# Patient Record
Sex: FEMALE | Race: BLACK OR AFRICAN AMERICAN | URBAN - METROPOLITAN AREA
[De-identification: names, ages, dates, MRNs, and addresses within clinical notes are randomized per-mention and may not be internally consistent; named-entity substitution may affect disease eponyms.]

---

## 2021-10-15 ENCOUNTER — HOSPITAL ENCOUNTER (OUTPATIENT)
Age: 56
Setting detail: SPECIMEN
Discharge: HOME | End: 2021-10-15

## 2021-10-15 PROCEDURE — 88341 IMHCHEM/IMCYTCHM EA ADD ANTB: CPT | Performed by: PATHOLOGY

## 2021-10-15 PROCEDURE — 88342 IMHCHEM/IMCYTCHM 1ST ANTB: CPT | Performed by: PATHOLOGY

## 2022-10-21 ENCOUNTER — OFFICE VISIT (OUTPATIENT)
Dept: FAMILY MEDICINE CLINIC | Age: 57
End: 2022-10-21
Payer: COMMERCIAL

## 2022-10-21 VITALS
BODY MASS INDEX: 46.42 KG/M2 | DIASTOLIC BLOOD PRESSURE: 110 MMHG | HEIGHT: 65 IN | WEIGHT: 278.6 LBS | OXYGEN SATURATION: 96 % | SYSTOLIC BLOOD PRESSURE: 155 MMHG | HEART RATE: 83 BPM

## 2022-10-21 DIAGNOSIS — I10 PRIMARY HYPERTENSION: ICD-10-CM

## 2022-10-21 DIAGNOSIS — E66.01 CLASS 3 SEVERE OBESITY WITH BODY MASS INDEX (BMI) OF 45.0 TO 49.9 IN ADULT, UNSPECIFIED OBESITY TYPE, UNSPECIFIED WHETHER SERIOUS COMORBIDITY PRESENT (HCC): ICD-10-CM

## 2022-10-21 DIAGNOSIS — I10 PRIMARY HYPERTENSION: Primary | ICD-10-CM

## 2022-10-21 LAB
A/G RATIO: 1.4 (ref 1.1–2.2)
ALBUMIN SERPL-MCNC: 4.1 G/DL (ref 3.4–5)
ALP BLD-CCNC: 161 U/L (ref 40–129)
ALT SERPL-CCNC: 16 U/L (ref 10–40)
ANION GAP SERPL CALCULATED.3IONS-SCNC: 15 MMOL/L (ref 3–16)
AST SERPL-CCNC: 22 U/L (ref 15–37)
BASOPHILS ABSOLUTE: 0 K/UL (ref 0–0.2)
BASOPHILS RELATIVE PERCENT: 0.3 %
BILIRUB SERPL-MCNC: 0.4 MG/DL (ref 0–1)
BUN BLDV-MCNC: 17 MG/DL (ref 7–20)
CALCIUM SERPL-MCNC: 9.2 MG/DL (ref 8.3–10.6)
CHLORIDE BLD-SCNC: 105 MMOL/L (ref 99–110)
CHOLESTEROL, TOTAL: 166 MG/DL (ref 0–199)
CO2: 24 MMOL/L (ref 21–32)
CREAT SERPL-MCNC: 0.8 MG/DL (ref 0.6–1.1)
EOSINOPHILS ABSOLUTE: 0.2 K/UL (ref 0–0.6)
EOSINOPHILS RELATIVE PERCENT: 2.9 %
GFR SERPL CREATININE-BSD FRML MDRD: >60 ML/MIN/{1.73_M2}
GLUCOSE FASTING: 83 MG/DL (ref 70–99)
HCT VFR BLD CALC: 40.5 % (ref 36–48)
HDLC SERPL-MCNC: 46 MG/DL (ref 40–60)
HEMOGLOBIN: 13 G/DL (ref 12–16)
LDL CHOLESTEROL CALCULATED: 106 MG/DL
LYMPHOCYTES ABSOLUTE: 2.4 K/UL (ref 1–5.1)
LYMPHOCYTES RELATIVE PERCENT: 43.5 %
MCH RBC QN AUTO: 29.3 PG (ref 26–34)
MCHC RBC AUTO-ENTMCNC: 32.2 G/DL (ref 31–36)
MCV RBC AUTO: 90.8 FL (ref 80–100)
MONOCYTES ABSOLUTE: 0.3 K/UL (ref 0–1.3)
MONOCYTES RELATIVE PERCENT: 5.6 %
NEUTROPHILS ABSOLUTE: 2.7 K/UL (ref 1.7–7.7)
NEUTROPHILS RELATIVE PERCENT: 47.7 %
PDW BLD-RTO: 14 % (ref 12.4–15.4)
PLATELET # BLD: 222 K/UL (ref 135–450)
PMV BLD AUTO: 8.7 FL (ref 5–10.5)
POTASSIUM SERPL-SCNC: 4.1 MMOL/L (ref 3.5–5.1)
RBC # BLD: 4.46 M/UL (ref 4–5.2)
SODIUM BLD-SCNC: 144 MMOL/L (ref 136–145)
T4 FREE: 1.1 NG/DL (ref 0.9–1.8)
TOTAL PROTEIN: 7 G/DL (ref 6.4–8.2)
TRIGL SERPL-MCNC: 71 MG/DL (ref 0–150)
TSH SERPL DL<=0.05 MIU/L-ACNC: 1.03 UIU/ML (ref 0.27–4.2)
VLDLC SERPL CALC-MCNC: 14 MG/DL
WBC # BLD: 5.6 K/UL (ref 4–11)

## 2022-10-21 PROCEDURE — 99204 OFFICE O/P NEW MOD 45 MIN: CPT | Performed by: FAMILY MEDICINE

## 2022-10-21 RX ORDER — LOSARTAN POTASSIUM 50 MG/1
50 TABLET ORAL DAILY
Qty: 30 TABLET | Refills: 5 | Status: SHIPPED | OUTPATIENT
Start: 2022-10-21

## 2022-10-21 ASSESSMENT — PATIENT HEALTH QUESTIONNAIRE - PHQ9
SUM OF ALL RESPONSES TO PHQ QUESTIONS 1-9: 1
1. LITTLE INTEREST OR PLEASURE IN DOING THINGS: 0
SUM OF ALL RESPONSES TO PHQ QUESTIONS 1-9: 1
2. FEELING DOWN, DEPRESSED OR HOPELESS: 1
SUM OF ALL RESPONSES TO PHQ QUESTIONS 1-9: 1
SUM OF ALL RESPONSES TO PHQ9 QUESTIONS 1 & 2: 1
SUM OF ALL RESPONSES TO PHQ QUESTIONS 1-9: 1

## 2022-10-21 NOTE — PROGRESS NOTES
Dale Cortester  1965  10/23/22    Chief Complaint   Patient presents with    New Patient     Patient here to establish care with PCP. 59 yrs old lady with non PMH came today to establish with us, came from [facility],  with her, patient stats had mammogram and left breast biopsy done was negative. History reviewed. No pertinent past medical history. History reviewed. No pertinent surgical history. Family History   Problem Relation Age of Onset    Hypertension Brother      Social History     Socioeconomic History    Marital status: Single     Spouse name: Not on file    Number of children: Not on file    Years of education: Not on file    Highest education level: Not on file   Occupational History    Not on file   Tobacco Use    Smoking status: Never    Smokeless tobacco: Never   Substance and Sexual Activity    Alcohol use: Never    Drug use: Not on file    Sexual activity: Not on file   Other Topics Concern    Not on file   Social History Narrative    Not on file     Social Determinants of Health     Financial Resource Strain: Not on file   Food Insecurity: Not on file   Transportation Needs: Not on file   Physical Activity: Not on file   Stress: Not on file   Social Connections: Not on file   Intimate Partner Violence: Not on file   Housing Stability: Not on file       No Known Allergies  Current Outpatient Medications   Medication Sig Dispense Refill    losartan (COZAAR) 50 MG tablet Take 1 tablet by mouth daily 30 tablet 5     No current facility-administered medications for this visit. Review of Systems   Constitutional:  Negative for activity change, appetite change, chills, fatigue and fever. HENT:  Negative for congestion, postnasal drip, sinus pressure and sore throat. Eyes:  Positive for visual disturbance (R eye). Respiratory:  Negative for cough, chest tightness, shortness of breath and wheezing. Cardiovascular:  Negative for chest pain and leg swelling. Gastrointestinal:  Negative for abdominal pain, constipation and diarrhea. Genitourinary:  Negative for dysuria and frequency. Musculoskeletal:  Negative for back pain and gait problem. Skin:  Negative for rash. Neurological:  Negative for dizziness, weakness and headaches. Psychiatric/Behavioral:  Negative for agitation, behavioral problems and sleep disturbance. The patient is not nervous/anxious. BP (!) 155/110   Pulse 83   Ht 5' 5\" (1.651 m)   Wt 278 lb 9.6 oz (126.4 kg)   SpO2 96%   BMI 46.36 kg/m²     BP Readings from Last 3 Encounters:   10/21/22 (!) 155/110       Wt Readings from Last 3 Encounters:   10/21/22 278 lb 9.6 oz (126.4 kg)         Physical Exam  Constitutional:       General: She is not in acute distress. Appearance: Normal appearance. She is well-developed. She is obese. She is not ill-appearing or diaphoretic. HENT:      Head: Normocephalic and atraumatic. Right Ear: Tympanic membrane normal. There is no impacted cerumen. Left Ear: Tympanic membrane normal. There is no impacted cerumen. Eyes:      General: No scleral icterus. Pupils: Pupils are equal, round, and reactive to light. Cardiovascular:      Rate and Rhythm: Normal rate and regular rhythm. Heart sounds: Normal heart sounds. No murmur heard. Pulmonary:      Effort: Pulmonary effort is normal.      Breath sounds: Normal breath sounds. No wheezing. Abdominal:      General: There is no distension. Palpations: There is no mass. Tenderness: There is no abdominal tenderness. Comments: obese   Musculoskeletal:         General: Normal range of motion. Cervical back: Normal range of motion and neck supple. No rigidity. Right lower leg: No edema. Left lower leg: No edema. Skin:     Findings: No rash. Neurological:      General: No focal deficit present. Mental Status: She is alert and oriented to person, place, and time.       Cranial Nerves: No cranial nerve deficit. Psychiatric:         Mood and Affect: Mood normal.         Behavior: Behavior normal.       ASSESSMENT/ PLAN:    1. Primary hypertension  - blood pressure high so start the losartan  - Lipid Panel; Future  - CBC with Auto Differential; Future  - Comprehensive Metabolic Panel, Fasting; Future  - Hemoglobin A1C; Future  - T4, Free; Future  - TSH; Future  - losartan (COZAAR) 50 MG tablet; Take 1 tablet by mouth daily  Dispense: 30 tablet; Refill: 5    2. Class 3 severe obesity with body mass index (BMI) of 45.0 to 49.9 in adult, unspecified obesity type, unspecified whether serious comorbidity present ([facility] [address]  - encourage to loss wt    Need to see the eye Dr for her R eye vision problem        - we need to get the mammogram form OV  - Appropriate prescription are addressed. - After visit summery provided. - Questions answered and patient verbalizes understanding.  - Call for any problem, questions, or concerns. Return in about 2 weeks (around 11/4/2022).

## 2022-10-22 LAB
ESTIMATED AVERAGE GLUCOSE: 128.4 MG/DL
HBA1C MFR BLD: 6.1 %

## 2022-10-23 PROBLEM — I10 PRIMARY HYPERTENSION: Status: ACTIVE | Noted: 2022-10-23

## 2022-10-23 PROBLEM — E66.01 CLASS 3 SEVERE OBESITY WITH BODY MASS INDEX (BMI) OF 45.0 TO 49.9 IN ADULT (HCC): Status: ACTIVE | Noted: 2022-10-23

## 2022-10-23 ASSESSMENT — ENCOUNTER SYMPTOMS
BACK PAIN: 0
CONSTIPATION: 0
SHORTNESS OF BREATH: 0
WHEEZING: 0
COUGH: 0
ABDOMINAL PAIN: 0
SINUS PRESSURE: 0
CHEST TIGHTNESS: 0
SORE THROAT: 0
DIARRHEA: 0

## 2022-10-26 ENCOUNTER — COMMUNITY OUTREACH (OUTPATIENT)
Dept: FAMILY MEDICINE CLINIC | Age: 57
End: 2022-10-26

## 2022-10-26 NOTE — PROGRESS NOTES
Patient's HM shows they are overdue for Mammogram and Colorectal Screening. [employer] and  files searched. No results to attach to order nor HM updated.

## 2022-11-04 ENCOUNTER — OFFICE VISIT (OUTPATIENT)
Dept: FAMILY MEDICINE CLINIC | Age: 57
End: 2022-11-04
Payer: COMMERCIAL

## 2022-11-04 VITALS
HEART RATE: 88 BPM | SYSTOLIC BLOOD PRESSURE: 140 MMHG | WEIGHT: 275.8 LBS | BODY MASS INDEX: 45.9 KG/M2 | DIASTOLIC BLOOD PRESSURE: 85 MMHG | OXYGEN SATURATION: 96 %

## 2022-11-04 DIAGNOSIS — I10 PRIMARY HYPERTENSION: Primary | ICD-10-CM

## 2022-11-04 DIAGNOSIS — E66.01 CLASS 3 SEVERE OBESITY WITH BODY MASS INDEX (BMI) OF 45.0 TO 49.9 IN ADULT, UNSPECIFIED OBESITY TYPE, UNSPECIFIED WHETHER SERIOUS COMORBIDITY PRESENT (HCC): ICD-10-CM

## 2022-11-04 DIAGNOSIS — Z12.11 COLON CANCER SCREENING: ICD-10-CM

## 2022-11-04 DIAGNOSIS — M79.642 LEFT HAND PAIN: ICD-10-CM

## 2022-11-04 DIAGNOSIS — R73.03 PREDIABETES: ICD-10-CM

## 2022-11-04 PROCEDURE — 3077F SYST BP >= 140 MM HG: CPT | Performed by: FAMILY MEDICINE

## 2022-11-04 PROCEDURE — 99214 OFFICE O/P EST MOD 30 MIN: CPT | Performed by: FAMILY MEDICINE

## 2022-11-04 PROCEDURE — 3079F DIAST BP 80-89 MM HG: CPT | Performed by: FAMILY MEDICINE

## 2022-11-04 ASSESSMENT — ENCOUNTER SYMPTOMS
COUGH: 0
SHORTNESS OF BREATH: 0
ABDOMINAL PAIN: 0
BACK PAIN: 0

## 2022-11-04 NOTE — PROGRESS NOTES
Yessi Quintana  1965  11/04/22    Chief Complaint   Patient presents with    2 Week Follow-Up     For HTN, and discuss the lab results           Patient here with the  for 2 wks f/u regarding HTN and also to discuss the lab results. We did start her on losartan, The patient is taking hypertensive medications compliantly without side effects. Denies chest pain, dyspnea, edema, or TIA's. Patient also c/o:      Hand Pain   There was no injury mechanism. The pain is present in the left hand. The quality of the pain is described as cramping. The pain does not radiate. The pain is at a severity of 6/10. The pain is moderate. The pain has been Constant since the incident. Associated symptoms include numbness and tingling. Pertinent negatives include no chest pain or muscle weakness. The symptoms are aggravated by movement. She has tried nothing for the symptoms. No past medical history on file. No past surgical history on file.   Family History   Problem Relation Age of Onset    Hypertension Brother      Social History     Socioeconomic History    Marital status: Single     Spouse name: Not on file    Number of children: Not on file    Years of education: Not on file    Highest education level: Not on file   Occupational History    Not on file   Tobacco Use    Smoking status: Never    Smokeless tobacco: Never   Substance and Sexual Activity    Alcohol use: Never    Drug use: Not on file    Sexual activity: Not on file   Other Topics Concern    Not on file   Social History Narrative    Not on file     Social Determinants of Health     Financial Resource Strain: Not on file   Food Insecurity: Not on file   Transportation Needs: Not on file   Physical Activity: Not on file   Stress: Not on file   Social Connections: Not on file   Intimate Partner Violence: Not on file   Housing Stability: Not on file       No Known Allergies  Current Outpatient Medications   Medication Sig Dispense Refill    losartan (COZAAR) 50 MG tablet Take 1 tablet by mouth daily 30 tablet 5     No current facility-administered medications for this visit. Review of Systems   Constitutional:  Negative for activity change, appetite change, chills, fatigue and fever. HENT:  Negative for congestion. Respiratory:  Negative for cough and shortness of breath. Cardiovascular:  Negative for chest pain and leg swelling. Gastrointestinal:  Negative for abdominal pain. Genitourinary:  Negative for dysuria. Musculoskeletal:  Positive for arthralgias (left hand pain). Negative for back pain. Neurological:  Positive for tingling and numbness. Negative for dizziness, weakness and headaches. Psychiatric/Behavioral:  Negative for agitation, behavioral problems and sleep disturbance. The patient is not nervous/anxious.       Lab Results   Component Value Date    WBC 5.6 10/21/2022    HGB 13.0 10/21/2022    HCT 40.5 10/21/2022    MCV 90.8 10/21/2022     10/21/2022     Lab Results   Component Value Date     10/21/2022    K 4.1 10/21/2022     10/21/2022    CO2 24 10/21/2022    BUN 17 10/21/2022    CREATININE 0.8 10/21/2022    CALCIUM 9.2 10/21/2022    PROT 7.0 10/21/2022    LABALBU 4.1 10/21/2022    BILITOT 0.4 10/21/2022    ALKPHOS 161 (H) 10/21/2022    AST 22 10/21/2022    ALT 16 10/21/2022    LABGLOM >60 10/21/2022    AGRATIO 1.4 10/21/2022     Lab Results   Component Value Date    CHOL 166 10/21/2022     Lab Results   Component Value Date    TRIG 71 10/21/2022     Lab Results   Component Value Date    HDL 46 10/21/2022     Lab Results   Component Value Date    LDLCALC 106 (H) 10/21/2022     Lab Results   Component Value Date    LABA1C 6.1 10/21/2022     Lab Results   Component Value Date    TSH 1.03 10/21/2022         BP (!) 140/85   Pulse 88   Wt 275 lb 12.8 oz (125.1 kg)   SpO2 96%   BMI 45.90 kg/m²     BP Readings from Last 3 Encounters:   11/04/22 (!) 140/85   10/21/22 (!) 155/110       Wt Readings from Last 3 Encounters:   11/04/22 275 lb 12.8 oz (125.1 kg)   10/21/22 278 lb 9.6 oz (126.4 kg)         Physical Exam  Constitutional:       General: She is not in acute distress. Appearance: Normal appearance. She is well-developed. She is obese. She is not diaphoretic. HENT:      Head: Normocephalic and atraumatic. Eyes:      General: No scleral icterus. Pupils: Pupils are equal, round, and reactive to light. Cardiovascular:      Rate and Rhythm: Normal rate and regular rhythm. Heart sounds: Normal heart sounds. No murmur heard. Pulmonary:      Effort: Pulmonary effort is normal.      Breath sounds: Normal breath sounds. No wheezing. Musculoskeletal:         General: Normal range of motion. Left hand: No swelling or deformity. Decreased strength. There is disruption of two-point discrimination. Cervical back: Normal range of motion and neck supple. No rigidity. Right lower leg: No edema. Left lower leg: No edema. Neurological:      General: No focal deficit present. Mental Status: She is alert and oriented to person, place, and time. Psychiatric:         Mood and Affect: Mood normal.         Behavior: Behavior normal.       ASSESSMENT/ PLAN:    1. Primary hypertension  - better, last visit was started on losartan, no changes today    2. Prediabetes  - A1C 6.1, recommend to cut down the carb, and watch diet and lose wt    3. Left hand pain  - most likely carpal TS  - Bacilio Brown MD, Physical Medicine, [place]    4. Colon cancer screening  - Dani Bardales MD, Gastroenterology, [facility]    5. Class 3 severe obesity with body mass index (BMI) of 45.0 to 49.9 in adult, unspecified obesity type, unspecified whether serious comorbidity present ([facility] [address]  - encourage wt loss            - All old blood work reviewed with the patient  - Appropriate prescription are addressed. - After visit summery provided.   - Questions answered and patient verbalizes understanding.  - Call for any problem, questions, or concerns. Return in about 3 months (around 2/4/2023) for pap.

## 2022-11-16 ENCOUNTER — OFFICE VISIT (OUTPATIENT)
Dept: GASTROENTEROLOGY | Age: 57
End: 2022-11-16
Payer: COMMERCIAL

## 2022-11-16 VITALS
HEART RATE: 94 BPM | DIASTOLIC BLOOD PRESSURE: 84 MMHG | HEIGHT: 65 IN | OXYGEN SATURATION: 96 % | BODY MASS INDEX: 46.45 KG/M2 | WEIGHT: 278.8 LBS | TEMPERATURE: 97.1 F | SYSTOLIC BLOOD PRESSURE: 138 MMHG

## 2022-11-16 DIAGNOSIS — Z12.11 ENCOUNTER FOR SCREENING COLONOSCOPY: Primary | ICD-10-CM

## 2022-11-16 PROCEDURE — S0285 CNSLT BEFORE SCREEN COLONOSC: HCPCS | Performed by: NURSE PRACTITIONER

## 2022-11-16 RX ORDER — POLYETHYLENE GLYCOL 3350 17 G/17G
238 POWDER, FOR SOLUTION ORAL ONCE
Qty: 238 G | Refills: 0 | Status: SHIPPED | OUTPATIENT
Start: 2022-11-16 | End: 2022-11-16

## 2022-11-16 RX ORDER — BISACODYL 5 MG
TABLET, DELAYED RELEASE (ENTERIC COATED) ORAL
Qty: 4 TABLET | Refills: 0 | Status: SHIPPED | OUTPATIENT
Start: 2022-11-16

## 2022-11-16 NOTE — PROGRESS NOTES
Martinez Archuleta 59 y.o. female was seen by TOO Mayfield on 11/16/2022     Wt Readings from Last 3 Encounters:   11/16/22 278 lb 12.8 oz (126.5 kg)   11/04/22 275 lb 12.8 oz (125.1 kg)   10/21/22 278 lb 9.6 oz (126.4 kg)       EDEN Mckee is a pleasant 59 y.o. [place] female who presents today with  tablet to schedule a screening colonoscopy. She has a past medical history of hypertension, prediabetes and class 3 obesity. She has never had a colonoscopy. She denies changes in her bowel pattern. Her typical bowel pattern is daily to every other day with loose brown stools. No diarrhea or constipation. No blood in her stools or melena. No excess belching or flatulence. Her appetite is good without early satiety. Her weight is stable. No nausea or vomiting. No heartburn or acid reflux. No nocturnal awakenings with acid reflux. No dysphagia or pain with swallowing. No abdominal pain, bloating or distention. No family history of stomach or colon cancer. ROS  Constitutional:  Negative for activity change, appetite change, chills, fatigue and fever. HENT:  Negative for congestion. Respiratory:  Negative for cough and shortness of breath. Cardiovascular:  Negative for chest pain and leg swelling. Gastrointestinal:  Negative for abdominal pain. Genitourinary:  Negative for dysuria. Musculoskeletal:  Negative for back pain. Neurological:  Negative for dizziness, weakness and headaches. Psychiatric/Behavioral:  Negative for agitation, behavioral problems and sleep disturbance. The patient is not nervous/anxious. Allergies  No Known Allergies    Medications  Current Outpatient Medications   Medication Sig Dispense Refill    losartan (COZAAR) 50 MG tablet Take 1 tablet by mouth daily 30 tablet 5     No current facility-administered medications for this visit. Past medical history:   She has no past medical history on file.     Past surgical history:  She has no past surgical history on file. Social History:  She reports that she has never smoked. She has never used smokeless tobacco. She reports that she does not drink alcohol. Family history:  Her family history includes Hypertension in her brother. Objective    Vitals:    11/16/22 1554   BP: 138/84   Pulse: 94   Temp: 97.1 °F (36.2 °C)   SpO2: 96%        Physical exam    Physical Exam  Vitals reviewed. Constitutional:       General: She is not in acute distress. Appearance: Normal appearance. She is well-developed. She is obese. She is not ill-appearing, toxic-appearing or diaphoretic. HENT:      Head: Normocephalic and atraumatic. Nose: Nose normal.      Mouth/Throat:      Mouth: Mucous membranes are moist.   Eyes:      Conjunctiva/sclera: Conjunctivae normal.      Pupils: Pupils are equal, round, and reactive to light. Neck:      Thyroid: No thyromegaly. Vascular: No JVD. Trachea: No tracheal deviation. Cardiovascular:      Rate and Rhythm: Normal rate and regular rhythm. Pulses: Normal pulses. Heart sounds: Normal heart sounds. No murmur heard. No friction rub. No gallop. Pulmonary:      Effort: Pulmonary effort is normal. No respiratory distress. Breath sounds: Normal breath sounds. No stridor. No wheezing, rhonchi or rales. Chest:      Chest wall: No tenderness. Abdominal:      General: Bowel sounds are normal. There is no distension. Palpations: Abdomen is soft. There is no mass. Tenderness: There is no abdominal tenderness. There is no guarding or rebound. Hernia: No hernia is present. Musculoskeletal:         General: Normal range of motion. Cervical back: Normal range of motion and neck supple. Lymphadenopathy:      Cervical: No cervical adenopathy. Skin:     General: Skin is warm and dry. Neurological:      Mental Status: She is alert and oriented to person, place, and time.    Psychiatric:         Mood and Affect: Mood normal.       Orders Only on 10/21/2022   Component Date Value Ref Range Status    TSH 10/21/2022 1.03  0.27 - 4.20 uIU/mL Final    Cholesterol, Total 10/21/2022 166  0 - 199 mg/dL Final    Triglycerides 10/21/2022 71  0 - 150 mg/dL Final    HDL 10/21/2022 46  40 - 60 mg/dL Final    Comment: An HDL cholesterol less than 40 mg/dL is low and  constitutes a coronary heart disease risk factor. An HDL cholesterol greater than 60 mg/dL is a  negative risk factor for coronary heart disease.       LDL Calculated 10/21/2022 106 (A)  <100 mg/dL Final    VLDL Cholesterol Calculated 10/21/2022 14  Not Established mg/dL Final    WBC 10/21/2022 5.6  4.0 - 11.0 K/uL Final    RBC 10/21/2022 4.46  4.00 - 5.20 M/uL Final    Hemoglobin 10/21/2022 13.0  12.0 - 16.0 g/dL Final    Hematocrit 10/21/2022 40.5  36.0 - 48.0 % Final    MCV 10/21/2022 90.8  80.0 - 100.0 fL Final    MCH 10/21/2022 29.3  26.0 - 34.0 pg Final    MCHC 10/21/2022 32.2  31.0 - 36.0 g/dL Final    RDW 10/21/2022 14.0  12.4 - 15.4 % Final    Platelets 49/53/9984 222  135 - 450 K/uL Final    MPV 10/21/2022 8.7  5.0 - 10.5 fL Final    Neutrophils % 10/21/2022 47.7  % Final    Lymphocytes % 10/21/2022 43.5  % Final    Monocytes % 10/21/2022 5.6  % Final    Eosinophils % 10/21/2022 2.9  % Final    Basophils % 10/21/2022 0.3  % Final    Neutrophils Absolute 10/21/2022 2.7  1.7 - 7.7 K/uL Final    Lymphocytes Absolute 10/21/2022 2.4  1.0 - 5.1 K/uL Final    Monocytes Absolute 10/21/2022 0.3  0.0 - 1.3 K/uL Final    Eosinophils Absolute 10/21/2022 0.2  0.0 - 0.6 K/uL Final    Basophils Absolute 10/21/2022 0.0  0.0 - 0.2 K/uL Final    Sodium 10/21/2022 144  136 - 145 mmol/L Final    Potassium 10/21/2022 4.1  3.5 - 5.1 mmol/L Final    Chloride 10/21/2022 105  99 - 110 mmol/L Final    CO2 10/21/2022 24  21 - 32 mmol/L Final    Anion Gap 10/21/2022 15  3 - 16 Final    Glucose, Fasting 10/21/2022 83  70 - 99 mg/dL Final    BUN 10/21/2022 17  7 - 20 mg/dL Final    Creatinine 10/21/2022 0.8  0.6 - 1.1 mg/dL Final    Est, Glom Filt Rate 10/21/2022 >60  >60 Final    Comment: Pediatric calculator link  [email or web address] org/professionals/kdoqi/gfr_calculatorped  Effective Oct 3, 2022  These results are not intended for use in patients  <25years of age. eGFR results are calculated without  a race factor using the 2021 CKD-EPI equation. Careful  clinical correlation is recommended, particularly when  comparing to results calculated using previous equations. The CKD-EPI equation is less accurate in patients with  extremes of muscle mass, extra-renal metabolism of  creatinine, excessive creatinine ingestion, or following  therapy that affects renal tubular secretion. Calcium 10/21/2022 9.2  8.3 - 10.6 mg/dL Final    Total Protein 10/21/2022 7.0  6.4 - 8.2 g/dL Final    Albumin 10/21/2022 4.1  3.4 - 5.0 g/dL Final    Albumin/Globulin Ratio 10/21/2022 1.4  1.1 - 2.2 Final    Total Bilirubin 10/21/2022 0.4  0.0 - 1.0 mg/dL Final    Alkaline Phosphatase 10/21/2022 161 (A)  40 - 129 U/L Final    ALT 10/21/2022 16  10 - 40 U/L Final    AST 10/21/2022 22  15 - 37 U/L Final    Hemoglobin A1C 10/21/2022 6.1  See comment % Final    Comment: Comment:  Diagnosis of Diabetes: > or = 6.5%  Increased risk of diabetes (Prediabetes): 5.7-6.4%  Glycemic Control: Nonpregnant Adults: <7.0%                    Pregnant: <6.0%        eAG 10/21/2022 128.4  mg/dL Final    T4 Free 10/21/2022 1.1  0.9 - 1.8 ng/dL Final       Assessment and Plan:  1. Will plan for a colonoscopy with MAC anesthesia. The patient was informed of the risks and benefits of the procedure. 2.  Further recommendations for follow-up will be determined after the colonoscopy has been completed.

## 2022-11-21 ENCOUNTER — TELEPHONE (OUTPATIENT)
Dept: GASTROENTEROLOGY | Age: 57
End: 2022-11-21

## 2022-12-19 ENCOUNTER — PREP FOR PROCEDURE (OUTPATIENT)
Dept: GASTROENTEROLOGY | Age: 57
End: 2022-12-19

## 2022-12-19 RX ORDER — SODIUM CHLORIDE, SODIUM LACTATE, POTASSIUM CHLORIDE, CALCIUM CHLORIDE 600; 310; 30; 20 MG/100ML; MG/100ML; MG/100ML; MG/100ML
INJECTION, SOLUTION INTRAVENOUS CONTINUOUS
Status: CANCELLED | OUTPATIENT
Start: 2022-12-19

## 2022-12-19 RX ORDER — SODIUM CHLORIDE 0.9 % (FLUSH) 0.9 %
5-40 SYRINGE (ML) INJECTION PRN
Status: CANCELLED | OUTPATIENT
Start: 2022-12-19

## 2022-12-19 RX ORDER — SODIUM CHLORIDE 0.9 % (FLUSH) 0.9 %
5-40 SYRINGE (ML) INJECTION EVERY 12 HOURS SCHEDULED
Status: CANCELLED | OUTPATIENT
Start: 2022-12-19

## 2022-12-19 RX ORDER — SODIUM CHLORIDE 9 MG/ML
25 INJECTION, SOLUTION INTRAVENOUS PRN
Status: CANCELLED | OUTPATIENT
Start: 2022-12-19

## 2024-01-06 ENCOUNTER — HOSPITAL ENCOUNTER (EMERGENCY)
Age: 59
Discharge: HOME | End: 2024-01-06
Payer: COMMERCIAL

## 2024-01-06 VITALS
HEART RATE: 99 BPM | OXYGEN SATURATION: 97 % | DIASTOLIC BLOOD PRESSURE: 90 MMHG | RESPIRATION RATE: 19 BRPM | SYSTOLIC BLOOD PRESSURE: 165 MMHG | TEMPERATURE: 98.5 F

## 2024-01-06 DIAGNOSIS — M25.512 ACUTE PAIN OF LEFT SHOULDER: Primary | ICD-10-CM

## 2024-01-06 DIAGNOSIS — R35.0 URINARY FREQUENCY: ICD-10-CM

## 2024-01-06 DIAGNOSIS — R30.0 DYSURIA: ICD-10-CM

## 2024-01-06 LAB
BILIRUBIN URINE: NEGATIVE MG/DL
BLOOD, URINE: NEGATIVE
CLARITY: CLEAR
COLOR: YELLOW
COMMENT UA: NORMAL
GLUCOSE BLD-MCNC: 120 MG/DL (ref 70–99)
GLUCOSE, URINE: NEGATIVE MG/DL
KETONES, URINE: NEGATIVE MG/DL
LEUKOCYTE ESTERASE, URINE: NEGATIVE
NITRITE URINE, QUANTITATIVE: NEGATIVE
PH, URINE: 6 (ref 5–8)
PROTEIN UA: NEGATIVE MG/DL
SPECIFIC GRAVITY UA: 1.02 (ref 1–1.03)
UROBILINOGEN, URINE: 0.2 MG/DL (ref 0.2–1)

## 2024-01-06 PROCEDURE — 99283 EMERGENCY DEPT VISIT LOW MDM: CPT

## 2024-01-06 PROCEDURE — 82962 GLUCOSE BLOOD TEST: CPT

## 2024-01-06 PROCEDURE — 81003 URINALYSIS AUTO W/O SCOPE: CPT

## 2024-01-06 RX ORDER — IBUPROFEN 600 MG/1
600 TABLET ORAL EVERY 6 HOURS PRN
Qty: 120 TABLET | Refills: 0 | Status: SHIPPED | OUTPATIENT
Start: 2024-01-06 | End: 2024-02-05

## 2024-01-06 NOTE — ED TRIAGE NOTES
Patient to the ED with complaints of left shoulder pain and dysuria. Patient reports that her bilateral wrist, up to elbows have also been painful, more so when lifting items. Patient reports that dysuria has been going on for 3  months, nothing worsening that has brought her in today.

## 2024-01-06 NOTE — ED PROVIDER NOTES
[facility] EMERGENCY DEPARTMENT  EMERGENCY DEPARTMENT ENCOUNTER        Pt Name: Martinez Archuleta  MRN: [id number]  Birthdate 1965  Date of evaluation: 1/6/2024  Provider: CIERRA KEVIN - CNP  PCP: Tamara Carroll MD    SANDRA. I have evaluated this patient.        Triage CHIEF COMPLAINT       Chief Complaint   Patient presents with    Shoulder Pain     Left, as well as bilateral wrist pain to elbows that hurts worse when lifting something     Dysuria     And frequent urination- for 2-3 months. No worsening pain or frequency recently. Doesn't know why she's just coming in today          HISTORY OF PRESENT ILLNESS      Chief Complaint: dysuria, urinary frequency, nocturia, shoulder pain    Martinez Archuleta is a 58 y.o. female who presents for evaluation of dysuria, urinary frequency, nocturia as well as polyuria.  No prior history of DM.  The urinary symptoms have been going on for the last 2-3 months.    Denies abdominal or back pain, F/C, N/V/D.    She also complains of left shoulder pain for a couple of weeks, no trauma or injury, no change in activities.  No medications tried at home.  Pain is only occurring at night and is dependent on how she is positioned.  Repositioning can relieve some of the pain.  No neck pain.  She has chronic pain in her hips, knees, wrists which has been ongoing and is unchanged.  She has also had bilateral hand weakness for a year.  No new numbness or paresthesias.  She saw a PCP a while ago but did not realize that this is her doctor to follow-up for ongoing management.  She has not run out of her blood pressure medication    Nursing Notes were all reviewed and agreed with or any disagreements were addressed in the HPI.    REVIEW OF SYSTEMS     Pertinent ROS as noted in HPI.      PAST MEDICAL HISTORY     Past Medical History:   Diagnosis Date    Hypertension        SURGICAL HISTORY   History reviewed. No pertinent surgical  history.    CURRENTMEDICATIONS       Discharge Medication List as of 1/6/2024  1:15 PM        CONTINUE these medications which have NOT CHANGED    Details   bisacodyl 5 MG EC tablet Take 4 tablets once for colonoscopy prep, Disp-4 tablet, R-0Normal      losartan (COZAAR) 50 MG tablet Take 1 tablet by mouth daily, Disp-30 tablet, R-5Normal             ALLERGIES     Patient has no known allergies.    FAMILYHISTORY       Family History   Problem Relation Age of Onset    Hypertension Brother         SOCIAL HISTORY       Social History     Socioeconomic History    Marital status: Single     Spouse name: None    Number of children: None    Years of education: None    Highest education level: None   Tobacco Use    Smoking status: Never     Passive exposure: Never    Smokeless tobacco: Never   Vaping Use    Vaping Use: Never used   Substance and Sexual Activity    Alcohol use: Never    Drug use: Never       SCREENINGS    [place] Coma Scale  Eye Opening: Spontaneous  Best Verbal Response: Oriented  Best Motor Response: Obeys commands  Yasmine Coma Scale Score: 15      PHYSICAL EXAM       ED Triage Vitals [01/06/24 1103]   BP Temp Temp Source Pulse Respirations SpO2 Height Weight   (!) 188/100 98.5 °F (36.9 °C) Oral (!) 103 19 98 % -- --        Constitutional:  Well developed, Well nourished.  No distress  HENT:  Normocephalic, Atraumatic.  Moist mucus membranes.    Neck/Lymphatics: supple, no swollen nodes  Cardiovascular:   RRR,  no murmurs/rubs/gallops.    Respiratory:   Nonlabored breathing.  Normal breath sounds, No wheezing  Abdomen: morbidly obese, Bowel sounds normal, Soft, No tenderness, no masses.  Integument:   Warm, Dry, No rashes.  Musculoskeletal:  No palpable tenderness over bilateral upper extremities, cervical spine bones.  There is normal ROM of neck, shoulders without reproducible pain.  Neurologic:  Alert & oriented x4, No focal deficits noted.   Sensation intact.    Psychiatric:  Affect normal, Mood

## 2024-01-20 ENCOUNTER — HOSPITAL ENCOUNTER (EMERGENCY)
Age: 59
Discharge: HOME | End: 2024-01-20
Attending: EMERGENCY MEDICINE
Payer: COMMERCIAL

## 2024-01-20 VITALS
HEART RATE: 95 BPM | SYSTOLIC BLOOD PRESSURE: 187 MMHG | DIASTOLIC BLOOD PRESSURE: 105 MMHG | RESPIRATION RATE: 18 BRPM | TEMPERATURE: 98.2 F | OXYGEN SATURATION: 97 %

## 2024-01-20 DIAGNOSIS — T78.40XA ALLERGIC REACTION, INITIAL ENCOUNTER: Primary | ICD-10-CM

## 2024-01-20 PROCEDURE — 99283 EMERGENCY DEPT VISIT LOW MDM: CPT

## 2024-01-20 RX ORDER — PREDNISONE 50 MG/1
50 TABLET ORAL DAILY
Qty: 5 TABLET | Refills: 0 | Status: SHIPPED | OUTPATIENT
Start: 2024-01-20 | End: 2024-01-25

## 2024-01-20 NOTE — DISCHARGE INSTRUCTIONS
Prednisone as instructed until gone  Consult with your primary care physician for further care recommendations for your symptoms; call their office Monday for an appointment

## 2024-01-20 NOTE — ED PROVIDER NOTES
Emergency Department Encounter    Patient: Martinez Archuleta  MRN: [id number]  : 1965  Date of Evaluation: 2024  ED Provider:  To Wooten MD    Triage Chief Complaint:   Rash (States she was seen for it a week ago, )    [place]:  Martinez Archuleta is a 58 y.o. female [place]an that presents the emergency department complaint of acute itching rash in her mid to lower back extending to the posterior gluteal muscle areas bilaterally which started several days ago and associate with fever chills.  Patient states she has not had similar experience before and does not recall any change of her environment or taking any medications.  She had not seen anybody for the rash.  Denies history of seasonal allergies or severe allergic reaction in the past.    ROS - see HPI, below listed is current ROS at time of my eval:  General:  No fevers, no chills, no weakness  Eyes:  No recent vison changes, no discharge  ENT:  No sore throat, no nasal congestion, no hearing changes  Cardiovascular:  No chest pain, no palpitations  Respiratory:  No shortness of breath, no cough, no wheezing  Gastrointestinal:  No pain, no nausea, no vomiting, no diarrhea  Musculoskeletal:  No muscle pain, no joint pain  Skin: Scaly rash mid to lower back areas  Neurologic:  No speech problems, no headache, no extremity numbness, no extremity tingling, no extremity weakness  Psychiatric:  No anxiety  Genitourinary:  No dysuria, no hematuria  Endocrine:  No unexpected weight gain, no unexpected weight loss  Extremities:  no edema, no pain    Past Medical History:   Diagnosis Date    Hypertension      History reviewed. No pertinent surgical history.  Family History   Problem Relation Age of Onset    Hypertension Brother      Social History     Socioeconomic History    Marital status: Single     Spouse name: Not on file    Number of children: Not on file    Years of education: Not on file    Highest education level: Not on file   Occupational History    Not  as words and phrases that may be inappropriate.  Efforts were made to edit the dictations.       To Wooten MD  01/20/24 1141       To Wooten MD  01/20/24 1150

## 2024-02-13 ENCOUNTER — OFFICE VISIT (OUTPATIENT)
Dept: OBGYN | Age: 59
End: 2024-02-13
Payer: COMMERCIAL

## 2024-02-13 ENCOUNTER — HOSPITAL ENCOUNTER (OUTPATIENT)
Age: 59
Setting detail: SPECIMEN
Discharge: HOME | End: 2024-02-13
Payer: COMMERCIAL

## 2024-02-13 VITALS
DIASTOLIC BLOOD PRESSURE: 79 MMHG | BODY MASS INDEX: 48.82 KG/M2 | HEIGHT: 65 IN | WEIGHT: 293 LBS | SYSTOLIC BLOOD PRESSURE: 132 MMHG

## 2024-02-13 DIAGNOSIS — Z11.51 ENCOUNTER FOR SCREENING FOR HUMAN PAPILLOMAVIRUS (HPV): ICD-10-CM

## 2024-02-13 DIAGNOSIS — Z01.419 ENCOUNTER FOR ANNUAL ROUTINE GYNECOLOGICAL EXAMINATION: Primary | ICD-10-CM

## 2024-02-13 DIAGNOSIS — Z12.31 SCREENING MAMMOGRAM FOR BREAST CANCER: ICD-10-CM

## 2024-02-13 DIAGNOSIS — Z78.0 ENCOUNTER FOR OSTEOPOROSIS SCREENING IN ASYMPTOMATIC POSTMENOPAUSAL PATIENT: ICD-10-CM

## 2024-02-13 DIAGNOSIS — Z13.820 ENCOUNTER FOR OSTEOPOROSIS SCREENING IN ASYMPTOMATIC POSTMENOPAUSAL PATIENT: ICD-10-CM

## 2024-02-13 DIAGNOSIS — G89.29 CHRONIC FEMALE PELVIC PAIN: ICD-10-CM

## 2024-02-13 DIAGNOSIS — R10.2 CHRONIC FEMALE PELVIC PAIN: ICD-10-CM

## 2024-02-13 LAB
DEPRECATED RDW RBC AUTO: 14.5 % (ref 12.4–15.4)
HCT VFR BLD AUTO: 45.1 % (ref 36–48)
HGB BLD-MCNC: 14.3 G/DL (ref 12–16)
MCH RBC QN AUTO: 28.4 PG (ref 26–34)
MCHC RBC AUTO-ENTMCNC: 31.6 G/DL (ref 31–36)
MCV RBC AUTO: 89.7 FL (ref 80–100)
PLATELET # BLD AUTO: 278 K/UL (ref 135–450)
PMV BLD AUTO: 9.1 FL (ref 5–10.5)
RBC # BLD AUTO: 5.03 M/UL (ref 4–5.2)
WBC # BLD AUTO: 6.6 K/UL (ref 4–11)

## 2024-02-13 PROCEDURE — 88142 CYTOPATH C/V THIN LAYER: CPT

## 2024-02-13 PROCEDURE — 87624 HPV HI-RISK TYP POOLED RSLT: CPT

## 2024-02-13 PROCEDURE — 3075F SYST BP GE 130 - 139MM HG: CPT | Performed by: OBSTETRICS & GYNECOLOGY

## 2024-02-13 PROCEDURE — 99386 PREV VISIT NEW AGE 40-64: CPT | Performed by: OBSTETRICS & GYNECOLOGY

## 2024-02-13 PROCEDURE — 3078F DIAST BP <80 MM HG: CPT | Performed by: OBSTETRICS & GYNECOLOGY

## 2024-02-13 PROCEDURE — 36415 COLL VENOUS BLD VENIPUNCTURE: CPT | Performed by: OBSTETRICS & GYNECOLOGY

## 2024-02-13 NOTE — PROGRESS NOTES
2/13/24    Martinez Archuleta  1965    Chief Complaint   Patient presents with    New Patient     Annual exam, postmenopausal, No HRT, is sexually active, Last pap smear was 2 year ago unknown, last mammo was 3 years ago normal, never had dexa, never had colonoscopy.     Pelvic Pain     Pt c/o pelvic pain x 2 years that comes and goes, midline pelvic pain. Pt states the pain ranges from 3/10 to 6/10 on pain scale.         Martinez Archuleta is a 58 y.o. female who presents today for evaluation of annual and pelvic pain.      Past Medical History:   Diagnosis Date    Hypertension     Pelvic pain        No past surgical history on file.    Social History     Tobacco Use    Smoking status: Never     Passive exposure: Never    Smokeless tobacco: Never   Vaping Use    Vaping Use: Never used   Substance Use Topics    Alcohol use: Never    Drug use: Never       Family History   Problem Relation Age of Onset    Hypertension Brother        Current Outpatient Medications   Medication Sig Dispense Refill    ibuprofen (ADVIL;MOTRIN) 600 MG tablet Take 1 tablet by mouth every 6 hours as needed for Pain (Patient not taking: Reported on 1/20/2024) 120 tablet 0    bisacodyl 5 MG EC tablet Take 4 tablets once for colonoscopy prep (Patient not taking: Reported on 1/20/2024) 4 tablet 0    losartan (COZAAR) 50 MG tablet Take 1 tablet by mouth daily (Patient not taking: Reported on 1/20/2024) 30 tablet 5     No current facility-administered medications for this visit.       No Known Allergies    No obstetric history on file.      There is no immunization history on file for this patient.    Review of Systems   Genitourinary:  Negative for pelvic pain.       /79 (Site: Left Upper Arm, Position: Sitting, Cuff Size: Large Adult)   Ht 1.651 m (5' 5\")   Wt 132.9 kg (293 lb)   BMI 48.76 kg/m²     Physical Exam  Exam conducted with a chaperone present.   Constitutional:       Appearance: Normal appearance.   HENT:      Head: Normocephalic

## 2024-02-14 LAB
BACTERIA URNS QL MICRO: ABNORMAL /HPF
BILIRUB UR QL STRIP.AUTO: NEGATIVE
CLARITY UR: ABNORMAL
COLOR UR: YELLOW
EPI CELLS #/AREA URNS AUTO: 8 /HPF (ref 0–5)
GLUCOSE UR STRIP.AUTO-MCNC: NEGATIVE MG/DL
HGB UR QL STRIP.AUTO: NEGATIVE
HYALINE CASTS #/AREA URNS AUTO: 0 /LPF (ref 0–8)
KETONES UR STRIP.AUTO-MCNC: ABNORMAL MG/DL
LEUKOCYTE ESTERASE UR QL STRIP.AUTO: ABNORMAL
MUCUS: PRESENT
NITRITE UR QL STRIP.AUTO: NEGATIVE
PH UR STRIP.AUTO: 6 [PH] (ref 5–8)
PROT UR STRIP.AUTO-MCNC: ABNORMAL MG/DL
RBC CLUMPS #/AREA URNS AUTO: 2 /HPF (ref 0–4)
SP GR UR STRIP.AUTO: 1.03 (ref 1–1.03)
UA COMPLETE W REFLEX CULTURE PNL UR: ABNORMAL
UA DIPSTICK W REFLEX MICRO PNL UR: YES
URN SPEC COLLECT METH UR: ABNORMAL
UROBILINOGEN UR STRIP-ACNC: 0.2 E.U./DL
WBC #/AREA URNS AUTO: 1 /HPF (ref 0–5)

## 2024-02-17 LAB
HPV HIGH RISK: NOT DETECTED
HPV, GENOTYPE 16: NOT DETECTED
HPV, GENOTYPE 18: NOT DETECTED

## 2024-02-21 ENCOUNTER — TELEPHONE (OUTPATIENT)
Dept: OBGYN | Age: 59
End: 2024-02-21

## 2024-02-21 NOTE — TELEPHONE ENCOUNTER
Unable to lm w/ lokeshp #[id number] to let pt know she needs to redo stool occult cards. Will lm on pt next appt with [facility]

## 2024-03-21 ENCOUNTER — OFFICE VISIT (OUTPATIENT)
Dept: OBGYN | Age: 59
End: 2024-03-21
Payer: COMMERCIAL

## 2024-03-21 VITALS
HEART RATE: 94 BPM | DIASTOLIC BLOOD PRESSURE: 140 MMHG | SYSTOLIC BLOOD PRESSURE: 190 MMHG | BODY MASS INDEX: 48.76 KG/M2 | WEIGHT: 293 LBS

## 2024-03-21 DIAGNOSIS — E66.01 MORBID OBESITY (HCC): ICD-10-CM

## 2024-03-21 DIAGNOSIS — G89.29 CHRONIC FEMALE PELVIC PAIN: Primary | ICD-10-CM

## 2024-03-21 DIAGNOSIS — R10.2 CHRONIC FEMALE PELVIC PAIN: Primary | ICD-10-CM

## 2024-03-21 DIAGNOSIS — I10 ESSENTIAL HYPERTENSION: ICD-10-CM

## 2024-03-21 DIAGNOSIS — R10.32 LLQ PAIN: ICD-10-CM

## 2024-03-21 PROCEDURE — 3077F SYST BP >= 140 MM HG: CPT | Performed by: OBSTETRICS & GYNECOLOGY

## 2024-03-21 PROCEDURE — 3080F DIAST BP >= 90 MM HG: CPT | Performed by: OBSTETRICS & GYNECOLOGY

## 2024-03-21 PROCEDURE — 99214 OFFICE O/P EST MOD 30 MIN: CPT | Performed by: OBSTETRICS & GYNECOLOGY

## 2024-03-21 RX ORDER — LISINOPRIL 5 MG/1
5 TABLET ORAL DAILY
Qty: 90 TABLET | Refills: 1 | Status: SHIPPED | OUTPATIENT
Start: 2024-03-21

## 2024-03-21 NOTE — PROGRESS NOTES
3/21/24    Martinez Archuleta  1965    Chief Complaint   Patient presents with    Follow-up     Pt c/o chronic pelvic pain. Had u/s 2/20/24. Pt brought stool screening samples in today to be sent. Pt states she is not seeing a pcp yet. Pt states mammogram and dexa is scheduled for 4/12/24.  Interpret. ID [id number]        Martinez Archuleta is a 58 y.o. female who presents today for evaluation of lower pelvic pain.   Pain is moderate.  Worse in midline.      Collected Updated Procedure    02/13/2024 0905 02/14/2024 0232 Microscopic Urinalysis [[id number]]   (Abnormal)    Component Value Units   Bacteria, UA Rare Abnormal  /HPF   Hyaline Casts, UA 0 /LPF   WBC, UA 1 /HPF   RBC, UA 2 /HPF   Epithelial Cells, UA 8 High   /HPF   Mucus, UA Present Abnormal            02/13/2024 0905 02/14/2024 0232 Urinalysis with Reflex to Culture [[id number]]   (Abnormal)   Urine    Component Value Units   Color, UA Yellow    Clarity, UA CLOUDY Abnormal     Glucose, Ur Negative mg/dL   Bilirubin Urine Negative    Ketones, Urine TRACE Abnormal  mg/dL   Specific Gravity, UA 1.027    Blood, Urine Negative    pH, UA 6.0    Protein, UA TRACE Abnormal  mg/dL   Urobilinogen, Urine 0.2 E.U./dL   Nitrite, Urine Negative    Leukocyte Esterase, Urine TRACE Abnormal     Microscopic Examination YES    Urine Type Cleancatch    Urine Reflex to Culture Not Indicated           02/13/2024 0854 02/13/2024 2336 CBC [[id number]]   Blood    Component Value Units   WBC 6.6 K/uL   RBC 5.03 M/uL   Hemoglobin 14.3 g/dL   Hematocrit 45.1 %   MCV 89.7 fL   MCH 28.4 pg   MCHC 31.6 g/dL   RDW 14.5 %   Platelets 278 K/uL   MPV 9.1 fL          02/13/2024 0853 02/20/2024 1109 GYN Cytology [[id number]]  Component Value   No component results          Past Medical History:   Diagnosis Date    Hypertension     Pelvic pain        No past surgical history on file.    Social History     Tobacco Use    Smoking status: Never     Passive exposure: Never    Smokeless tobacco: Never

## 2024-03-22 DIAGNOSIS — G89.29 CHRONIC FEMALE PELVIC PAIN: ICD-10-CM

## 2024-03-22 DIAGNOSIS — I15.9 SECONDARY HYPERTENSION: Primary | ICD-10-CM

## 2024-03-22 DIAGNOSIS — R10.2 CHRONIC FEMALE PELVIC PAIN: ICD-10-CM

## 2024-03-22 LAB
ALBUMIN SERPL-MCNC: 4.5 G/DL (ref 3.4–5)
ALBUMIN/GLOB SERPL: 1.2 {RATIO} (ref 1.1–2.2)
ALP SERPL-CCNC: 198 U/L (ref 40–129)
ALT SERPL-CCNC: 19 U/L (ref 10–40)
ANION GAP SERPL CALCULATED.3IONS-SCNC: 18 MMOL/L (ref 3–16)
AST SERPL-CCNC: 20 U/L (ref 15–37)
BILIRUB SERPL-MCNC: 0.3 MG/DL (ref 0–1)
BUN SERPL-MCNC: 18 MG/DL (ref 7–20)
CALCIUM SERPL-MCNC: 9.9 MG/DL (ref 8.3–10.6)
CHLORIDE SERPL-SCNC: 106 MMOL/L (ref 99–110)
CHOLEST SERPL-MCNC: 225 MG/DL (ref 0–199)
CO2 SERPL-SCNC: 23 MMOL/L (ref 21–32)
CREAT SERPL-MCNC: 0.7 MG/DL (ref 0.6–1.2)
DEPRECATED RDW RBC AUTO: 14.2 % (ref 12.4–15.4)
EST. AVERAGE GLUCOSE BLD GHB EST-MCNC: 134.1 MG/DL
GFR SERPLBLD CREATININE-BSD FMLA CKD-EPI: >60 ML/MIN/{1.73_M2}
GLUCOSE SERPL-MCNC: 83 MG/DL (ref 70–99)
HBA1C MFR BLD: 6.3 %
HCT VFR BLD AUTO: 44.3 % (ref 36–48)
HDLC SERPL-MCNC: 55 MG/DL (ref 40–60)
HGB BLD-MCNC: 14.3 G/DL (ref 12–16)
LDLC SERPL CALC-MCNC: 148 MG/DL
MCH RBC QN AUTO: 28.7 PG (ref 26–34)
MCHC RBC AUTO-ENTMCNC: 32.2 G/DL (ref 31–36)
MCV RBC AUTO: 88.9 FL (ref 80–100)
PLATELET # BLD AUTO: 272 K/UL (ref 135–450)
PMV BLD AUTO: 9.1 FL (ref 5–10.5)
POTASSIUM SERPL-SCNC: 4 MMOL/L (ref 3.5–5.1)
PROT SERPL-MCNC: 8.2 G/DL (ref 6.4–8.2)
RBC # BLD AUTO: 4.98 M/UL (ref 4–5.2)
SODIUM SERPL-SCNC: 147 MMOL/L (ref 136–145)
TRIGL SERPL-MCNC: 111 MG/DL (ref 0–150)
TSH SERPL DL<=0.005 MIU/L-ACNC: 1.15 UIU/ML (ref 0.27–4.2)
VLDLC SERPL CALC-MCNC: 22 MG/DL
WBC # BLD AUTO: 7.1 K/UL (ref 4–11)

## 2024-03-26 ENCOUNTER — TELEPHONE (OUTPATIENT)
Dept: GASTROENTEROLOGY | Age: 59
End: 2024-03-26

## 2024-03-26 NOTE — TELEPHONE ENCOUNTER
Called pt. Using [employer] regarding referral for LLQ pain. LM for pt to call back to make an appt.

## 2024-03-27 LAB
HEMOCCULT SP1 STL QL: NORMAL
HEMOCCULT SP2 STL QL: NORMAL
HEMOCCULT SP3 STL QL: NORMAL
REASON FOR REJECTION: NORMAL
REJECTED TEST: NORMAL

## 2024-04-02 ENCOUNTER — TELEPHONE (OUTPATIENT)
Dept: GASTROENTEROLOGY | Age: 59
End: 2024-04-02

## 2024-04-02 NOTE — TELEPHONE ENCOUNTER
Called pt. Using [employer] language services regarding referral for LLQ pain. LM for pt to call back to make an appt.

## 2024-04-11 ENCOUNTER — TELEPHONE (OUTPATIENT)
Dept: GASTROENTEROLOGY | Age: 59
End: 2024-04-11

## 2024-04-11 NOTE — TELEPHONE ENCOUNTER
Called pt. For the 3rd time Using [employer] language services regarding referral for LLQ pain. LM for pt to call back to make an appt.

## 2024-04-12 ENCOUNTER — HOSPITAL ENCOUNTER (OUTPATIENT)
Dept: WOMENS IMAGING | Age: 59
Discharge: HOME | End: 2024-04-12
Attending: OBSTETRICS & GYNECOLOGY
Payer: COMMERCIAL

## 2024-04-12 DIAGNOSIS — Z78.0 ENCOUNTER FOR OSTEOPOROSIS SCREENING IN ASYMPTOMATIC POSTMENOPAUSAL PATIENT: ICD-10-CM

## 2024-04-12 DIAGNOSIS — Z13.820 ENCOUNTER FOR OSTEOPOROSIS SCREENING IN ASYMPTOMATIC POSTMENOPAUSAL PATIENT: ICD-10-CM

## 2024-04-12 DIAGNOSIS — Z12.31 SCREENING MAMMOGRAM FOR BREAST CANCER: ICD-10-CM

## 2024-04-12 PROCEDURE — 77080 DXA BONE DENSITY AXIAL: CPT

## 2024-04-12 PROCEDURE — 77063 BREAST TOMOSYNTHESIS BI: CPT

## 2024-04-14 DIAGNOSIS — M81.0 OSTEOPOROSIS, POST-MENOPAUSAL: Primary | ICD-10-CM

## 2024-04-23 DIAGNOSIS — M81.0 OSTEOPOROSIS, POST-MENOPAUSAL: Primary | ICD-10-CM

## 2024-04-29 ENCOUNTER — APPOINTMENT (OUTPATIENT)
Dept: GENERAL RADIOLOGY | Age: 59
DRG: 305 | End: 2024-04-29
Payer: COMMERCIAL

## 2024-04-29 ENCOUNTER — APPOINTMENT (OUTPATIENT)
Dept: CT IMAGING | Age: 59
DRG: 305 | End: 2024-04-29
Payer: COMMERCIAL

## 2024-04-29 ENCOUNTER — HOSPITAL ENCOUNTER (INPATIENT)
Age: 59
LOS: 1 days | Discharge: HOME | DRG: 305 | End: 2024-04-30
Attending: STUDENT IN AN ORGANIZED HEALTH CARE EDUCATION/TRAINING PROGRAM | Admitting: STUDENT IN AN ORGANIZED HEALTH CARE EDUCATION/TRAINING PROGRAM
Payer: COMMERCIAL

## 2024-04-29 DIAGNOSIS — R06.02 SHORTNESS OF BREATH: ICD-10-CM

## 2024-04-29 DIAGNOSIS — I16.1 HYPERTENSIVE EMERGENCY WITHOUT CONGESTIVE HEART FAILURE: ICD-10-CM

## 2024-04-29 DIAGNOSIS — I16.0 HYPERTENSIVE URGENCY: ICD-10-CM

## 2024-04-29 DIAGNOSIS — I21.4 NSTEMI (NON-ST ELEVATED MYOCARDIAL INFARCTION) (HCC): ICD-10-CM

## 2024-04-29 DIAGNOSIS — R06.09 DYSPNEA ON EXERTION: Primary | ICD-10-CM

## 2024-04-29 LAB
ALBUMIN SERPL-MCNC: 4.3 GM/DL (ref 3.4–5)
ALP BLD-CCNC: 197 IU/L (ref 40–129)
ALT SERPL-CCNC: 16 U/L (ref 10–40)
ANION GAP SERPL CALCULATED.3IONS-SCNC: 10 MMOL/L (ref 7–16)
AST SERPL-CCNC: 18 IU/L (ref 15–37)
BASOPHILS ABSOLUTE: 0 K/CU MM
BASOPHILS RELATIVE PERCENT: 0.3 % (ref 0–1)
BILIRUB SERPL-MCNC: 0.3 MG/DL (ref 0–1)
BUN SERPL-MCNC: 12 MG/DL (ref 6–23)
CALCIUM SERPL-MCNC: 9.3 MG/DL (ref 8.3–10.6)
CHLORIDE BLD-SCNC: 100 MMOL/L (ref 99–110)
CO2: 29 MMOL/L (ref 21–32)
CREAT SERPL-MCNC: 0.6 MG/DL (ref 0.6–1.1)
D DIMER: 0.51 UG/ML (FEU)
DIFFERENTIAL TYPE: ABNORMAL
EOSINOPHILS ABSOLUTE: 0.2 K/CU MM
EOSINOPHILS RELATIVE PERCENT: 3.2 % (ref 0–3)
GFR SERPL CREATININE-BSD FRML MDRD: >90 ML/MIN/1.73M2
GLUCOSE SERPL-MCNC: 99 MG/DL (ref 70–99)
HCT VFR BLD CALC: 44.9 % (ref 37–47)
HEMOGLOBIN: 14.1 GM/DL (ref 12.5–16)
IMMATURE NEUTROPHIL %: 0.2 % (ref 0–0.43)
LYMPHOCYTES ABSOLUTE: 2.6 K/CU MM
LYMPHOCYTES RELATIVE PERCENT: 42.6 % (ref 24–44)
MCH RBC QN AUTO: 28.5 PG (ref 27–31)
MCHC RBC AUTO-ENTMCNC: 31.4 % (ref 32–36)
MCV RBC AUTO: 90.7 FL (ref 78–100)
MONOCYTES ABSOLUTE: 0.3 K/CU MM
MONOCYTES RELATIVE PERCENT: 5.2 % (ref 0–4)
NEUTROPHILS RELATIVE PERCENT: 48.5 % (ref 36–66)
NUCLEATED RBC %: 0 %
PDW BLD-RTO: 13.5 % (ref 11.7–14.9)
PLATELET # BLD: 273 K/CU MM (ref 140–440)
PMV BLD AUTO: 10.3 FL (ref 7.5–11.1)
POTASSIUM SERPL-SCNC: 3.9 MMOL/L (ref 3.5–5.1)
PRO-BNP: <36 PG/ML
PRO-BNP: <36 PG/ML
RBC # BLD: 4.95 M/CU MM (ref 4.2–5.4)
SEGMENTED NEUTROPHILS ABSOLUTE COUNT: 2.9 K/CU MM
SODIUM BLD-SCNC: 139 MMOL/L (ref 135–145)
TOTAL IMMATURE NEUTOROPHIL: 0.01 K/CU MM
TOTAL NUCLEATED RBC: 0 K/CU MM
TOTAL PROTEIN: 8.2 GM/DL (ref 6.4–8.2)
TROPONIN, HIGH SENSITIVITY: 17 NG/L (ref 0–14)
TROPONIN, HIGH SENSITIVITY: 22 NG/L (ref 0–14)
TROPONIN, HIGH SENSITIVITY: 22 NG/L (ref 0–14)
WBC # BLD: 6 K/CU MM (ref 4–10.5)

## 2024-04-29 PROCEDURE — 80053 COMPREHEN METABOLIC PANEL: CPT

## 2024-04-29 PROCEDURE — 2580000003 HC RX 258: Performed by: PHYSICIAN ASSISTANT

## 2024-04-29 PROCEDURE — 2580000003 HC RX 258: Performed by: STUDENT IN AN ORGANIZED HEALTH CARE EDUCATION/TRAINING PROGRAM

## 2024-04-29 PROCEDURE — 6370000000 HC RX 637 (ALT 250 FOR IP): Performed by: STUDENT IN AN ORGANIZED HEALTH CARE EDUCATION/TRAINING PROGRAM

## 2024-04-29 PROCEDURE — 96365 THER/PROPH/DIAG IV INF INIT: CPT

## 2024-04-29 PROCEDURE — 87430 STREP A AG IA: CPT

## 2024-04-29 PROCEDURE — 99285 EMERGENCY DEPT VISIT HI MDM: CPT

## 2024-04-29 PROCEDURE — 6360000004 HC RX CONTRAST MEDICATION: Performed by: PHYSICIAN ASSISTANT

## 2024-04-29 PROCEDURE — 6370000000 HC RX 637 (ALT 250 FOR IP): Performed by: PHYSICIAN ASSISTANT

## 2024-04-29 PROCEDURE — 83036 HEMOGLOBIN GLYCOSYLATED A1C: CPT

## 2024-04-29 PROCEDURE — 84484 ASSAY OF TROPONIN QUANT: CPT

## 2024-04-29 PROCEDURE — 85379 FIBRIN DEGRADATION QUANT: CPT

## 2024-04-29 PROCEDURE — 71275 CT ANGIOGRAPHY CHEST: CPT

## 2024-04-29 PROCEDURE — 80061 LIPID PANEL: CPT

## 2024-04-29 PROCEDURE — 6360000002 HC RX W HCPCS: Performed by: STUDENT IN AN ORGANIZED HEALTH CARE EDUCATION/TRAINING PROGRAM

## 2024-04-29 PROCEDURE — 93005 ELECTROCARDIOGRAM TRACING: CPT | Performed by: PHYSICIAN ASSISTANT

## 2024-04-29 PROCEDURE — 71045 X-RAY EXAM CHEST 1 VIEW: CPT

## 2024-04-29 PROCEDURE — 85025 COMPLETE CBC W/AUTO DIFF WBC: CPT

## 2024-04-29 PROCEDURE — 2140000000 HC CCU INTERMEDIATE R&B

## 2024-04-29 PROCEDURE — 87081 CULTURE SCREEN ONLY: CPT

## 2024-04-29 PROCEDURE — 6360000002 HC RX W HCPCS: Performed by: PHYSICIAN ASSISTANT

## 2024-04-29 PROCEDURE — 83880 ASSAY OF NATRIURETIC PEPTIDE: CPT

## 2024-04-29 PROCEDURE — 36415 COLL VENOUS BLD VENIPUNCTURE: CPT

## 2024-04-29 RX ORDER — ACETAMINOPHEN 325 MG/1
650 TABLET ORAL ONCE
Status: COMPLETED | OUTPATIENT
Start: 2024-04-29 | End: 2024-04-29

## 2024-04-29 RX ORDER — SODIUM CHLORIDE 0.9 % (FLUSH) 0.9 %
5-40 SYRINGE (ML) INJECTION PRN
Status: DISCONTINUED | OUTPATIENT
Start: 2024-04-29 | End: 2024-04-30 | Stop reason: HOSPADM

## 2024-04-29 RX ORDER — SODIUM CHLORIDE 9 MG/ML
INJECTION, SOLUTION INTRAVENOUS PRN
Status: DISCONTINUED | OUTPATIENT
Start: 2024-04-29 | End: 2024-04-30 | Stop reason: HOSPADM

## 2024-04-29 RX ORDER — POTASSIUM CHLORIDE 7.45 MG/ML
10 INJECTION INTRAVENOUS PRN
Status: DISCONTINUED | OUTPATIENT
Start: 2024-04-29 | End: 2024-04-30 | Stop reason: HOSPADM

## 2024-04-29 RX ORDER — ASPIRIN 81 MG/1
81 TABLET, CHEWABLE ORAL DAILY
Status: DISCONTINUED | OUTPATIENT
Start: 2024-04-29 | End: 2024-04-29 | Stop reason: SDUPTHER

## 2024-04-29 RX ORDER — ACETAMINOPHEN 650 MG/1
650 SUPPOSITORY RECTAL EVERY 6 HOURS PRN
Status: DISCONTINUED | OUTPATIENT
Start: 2024-04-29 | End: 2024-04-30 | Stop reason: HOSPADM

## 2024-04-29 RX ORDER — LISINOPRIL 5 MG/1
5 TABLET ORAL DAILY
Status: DISCONTINUED | OUTPATIENT
Start: 2024-04-29 | End: 2024-04-30

## 2024-04-29 RX ORDER — ATORVASTATIN CALCIUM 40 MG/1
40 TABLET, FILM COATED ORAL NIGHTLY
Status: DISCONTINUED | OUTPATIENT
Start: 2024-04-29 | End: 2024-04-30 | Stop reason: HOSPADM

## 2024-04-29 RX ORDER — ASPIRIN 81 MG/1
81 TABLET, CHEWABLE ORAL DAILY
Status: DISCONTINUED | OUTPATIENT
Start: 2024-04-29 | End: 2024-04-30 | Stop reason: HOSPADM

## 2024-04-29 RX ORDER — ATORVASTATIN CALCIUM 40 MG/1
40 TABLET, FILM COATED ORAL NIGHTLY
Status: DISCONTINUED | OUTPATIENT
Start: 2024-04-29 | End: 2024-04-29 | Stop reason: SDUPTHER

## 2024-04-29 RX ORDER — SODIUM CHLORIDE 0.9 % (FLUSH) 0.9 %
5-40 SYRINGE (ML) INJECTION EVERY 12 HOURS SCHEDULED
Status: DISCONTINUED | OUTPATIENT
Start: 2024-04-29 | End: 2024-04-30 | Stop reason: HOSPADM

## 2024-04-29 RX ORDER — LABETALOL HYDROCHLORIDE 5 MG/ML
10 INJECTION, SOLUTION INTRAVENOUS ONCE
Status: DISCONTINUED | OUTPATIENT
Start: 2024-04-29 | End: 2024-04-29

## 2024-04-29 RX ORDER — ONDANSETRON 4 MG/1
4 TABLET, ORALLY DISINTEGRATING ORAL EVERY 8 HOURS PRN
Status: DISCONTINUED | OUTPATIENT
Start: 2024-04-29 | End: 2024-04-30 | Stop reason: HOSPADM

## 2024-04-29 RX ORDER — MAGNESIUM SULFATE IN WATER 40 MG/ML
2000 INJECTION, SOLUTION INTRAVENOUS PRN
Status: DISCONTINUED | OUTPATIENT
Start: 2024-04-29 | End: 2024-04-30 | Stop reason: HOSPADM

## 2024-04-29 RX ORDER — ACETAMINOPHEN 325 MG/1
650 TABLET ORAL EVERY 6 HOURS PRN
Status: DISCONTINUED | OUTPATIENT
Start: 2024-04-29 | End: 2024-04-30 | Stop reason: HOSPADM

## 2024-04-29 RX ORDER — POTASSIUM CHLORIDE 20 MEQ/1
40 TABLET, EXTENDED RELEASE ORAL PRN
Status: DISCONTINUED | OUTPATIENT
Start: 2024-04-29 | End: 2024-04-30 | Stop reason: HOSPADM

## 2024-04-29 RX ORDER — POLYETHYLENE GLYCOL 3350 17 G/17G
17 POWDER, FOR SOLUTION ORAL DAILY PRN
Status: DISCONTINUED | OUTPATIENT
Start: 2024-04-29 | End: 2024-04-30 | Stop reason: HOSPADM

## 2024-04-29 RX ORDER — ONDANSETRON 2 MG/ML
4 INJECTION INTRAMUSCULAR; INTRAVENOUS EVERY 6 HOURS PRN
Status: DISCONTINUED | OUTPATIENT
Start: 2024-04-29 | End: 2024-04-30 | Stop reason: HOSPADM

## 2024-04-29 RX ADMIN — SODIUM CHLORIDE 5 MG/HR: 9 INJECTION, SOLUTION INTRAVENOUS at 17:00

## 2024-04-29 RX ADMIN — SODIUM CHLORIDE 2.5 MG/HR: 9 INJECTION, SOLUTION INTRAVENOUS at 21:53

## 2024-04-29 RX ADMIN — ASPIRIN 81 MG: 81 TABLET, CHEWABLE ORAL at 20:17

## 2024-04-29 RX ADMIN — LISINOPRIL 5 MG: 5 TABLET ORAL at 20:17

## 2024-04-29 RX ADMIN — IOPAMIDOL 75 ML: 755 INJECTION, SOLUTION INTRAVENOUS at 17:45

## 2024-04-29 RX ADMIN — ACETAMINOPHEN 650 MG: 325 TABLET ORAL at 12:49

## 2024-04-29 RX ADMIN — ATORVASTATIN CALCIUM 40 MG: 40 TABLET, FILM COATED ORAL at 20:17

## 2024-04-29 ASSESSMENT — PAIN SCALES - GENERAL
PAINLEVEL_OUTOF10: 3
PAINLEVEL_OUTOF10: 7

## 2024-04-29 NOTE — ED PROVIDER NOTES
EKG shows sinus rhythm.  Normal axis.  No ST elevation or depression.  No ectopy.  No prior EKG available for comparison             Cora Christiansen DO  04/29/24 8330

## 2024-04-29 NOTE — ED NOTES
3N called that SBAR in   
Pt back from CT   
Pt to CT at this time   
D-Dimer, Quant 0.51 (*)     All other components within normal limits   TROPONIN - Abnormal; Notable for the following components:    Troponin, High Sensitivity 22 (*)     All other components within normal limits   TROPONIN - Abnormal; Notable for the following components:    Troponin, High Sensitivity 17 (*)     All other components within normal limits       Background  History:   Past Medical History:   Diagnosis Date    Hypertension     Pelvic pain        Assessment    Vitals:        Vitals:    04/29/24 1215 04/29/24 1230 04/29/24 1700 04/29/24 1715   BP: (!) 187/110 (!) 190/111 (!) 177/102 (!) 160/106   Pulse: 80 86  87   Resp:    27   Temp:       TempSrc:       SpO2: 94% 95% 96% 95%   Weight:       Height:         PO Status: Regular  O2 Flow Rate:      Cardiac Rhythm:   Last documented pain medication administered: See MAR   NIH Score: NIH     Active LDA's:   Peripheral IV 04/29/24 Right Antecubital (Active)   Site Assessment Clean, dry & intact 04/29/24 1641       Pertinent or High Risk Medications/Drips: yes   If Yes, please provide details: NiCARdipine   Blood Product Administration: no  If Yes, please provide details:     Recommendation    Incomplete orders   Additional Comments:   If any further questions, please call Sending RN at [phone]    Electronically signed by: Electronically signed by Alliyah Schoenleben, RN on 4/29/2024 at 5:20 PM

## 2024-04-29 NOTE — ED PROVIDER NOTES
12 lead EKG per my interpretation:  Normal Sinus Rhythm 78  Axis is   Normal  QTc is   421  There is no specific T wave changes appreciated.  There is no specific ST wave changes appreciated.    Prior EKG to compare with was not available        Tavo Garza DO  04/29/24 7505

## 2024-04-29 NOTE — H&P
breath COMPARISON: None. FINDINGS: Hazy attenuation greatest over the lung bases likely from body habitus. Cardiomediastinal silhouette is within normal limits. No focal consolidation identified. No pleural effusion or pneumothorax. Bones are unremarkable.     No acute cardiopulmonary findings. Electronically signed by Lei Davila MD      Personally reviewed Lab Studies, Imaging    Electronically signed by Dorys Mejia MD on 4/29/2024 at 6:50 PM

## 2024-04-29 NOTE — ED PROVIDER NOTES
Emergency Department Encounter    Patient: Martinez Archuleta  MRN: [id number]  : 1965  Date of Evaluation: 2024  ED Provider:  John Silveira PA-C    Triage Chief Complaint:   Headache (sob) and Shortness of Breath    [place]:  Martinez Archuleta is a 58 y.o. female that presents via pov with cc of headache, left side pain including arm, flank leg and shortness of breath. Reports three days of headache, generalized, left ear pain, also reports left-sided pain.  Denies dysuria abdominal pain.  No nausea vomiting or diarrhea.  No visual disturbances.  Also reports shortness of breath reported he feels short of breath with ambulation and when laying flat.  No shortness of breath when sitting upright.  Denies any significant chest pain, diaphoresis or calf pain or swelling.  No history of PE or DVT.  No history of carotid artery dissections or coronary artery disease or aortic dissection or aneurysms.  Has been compliant with her hypertension medications.  Denies smoking or cocaine use.    ROS - see HPI, below listed is current ROS at time of my eval:  10 systems reviewed and negative except as above.     Past Medical History:   Diagnosis Date    Hypertension     Pelvic pain      No past surgical history on file.  Family History   Problem Relation Age of Onset    Hypertension Brother     Breast Cancer Neg Hx      Social History     Socioeconomic History    Marital status: Single     Spouse name: Not on file    Number of children: Not on file    Years of education: Not on file    Highest education level: Not on file   Occupational History    Not on file   Tobacco Use    Smoking status: Never     Passive exposure: Never    Smokeless tobacco: Never   Vaping Use    Vaping Use: Never used   Substance and Sexual Activity    Alcohol use: Never    Drug use: Never    Sexual activity: Yes     Partners: Male   Other Topics Concern    Not on file   Social History Narrative    Not on file     Social Determinants of Health

## 2024-04-30 ENCOUNTER — APPOINTMENT (OUTPATIENT)
Dept: NON INVASIVE DIAGNOSTICS | Age: 59
DRG: 305 | End: 2024-04-30
Attending: INTERNAL MEDICINE
Payer: COMMERCIAL

## 2024-04-30 VITALS
BODY MASS INDEX: 48.82 KG/M2 | HEART RATE: 85 BPM | OXYGEN SATURATION: 95 % | WEIGHT: 293 LBS | DIASTOLIC BLOOD PRESSURE: 96 MMHG | RESPIRATION RATE: 20 BRPM | TEMPERATURE: 98.7 F | SYSTOLIC BLOOD PRESSURE: 146 MMHG | HEIGHT: 65 IN

## 2024-04-30 PROBLEM — I16.0 HYPERTENSIVE URGENCY: Status: RESOLVED | Noted: 2024-04-29 | Resolved: 2024-04-30

## 2024-04-30 LAB
ANION GAP SERPL CALCULATED.3IONS-SCNC: 12 MMOL/L (ref 7–16)
BUN SERPL-MCNC: 12 MG/DL (ref 6–23)
CALCIUM SERPL-MCNC: 8.6 MG/DL (ref 8.3–10.6)
CHLORIDE BLD-SCNC: 103 MMOL/L (ref 99–110)
CHOLEST SERPL-MCNC: 180 MG/DL
CO2: 24 MMOL/L (ref 21–32)
CREAT SERPL-MCNC: 0.6 MG/DL (ref 0.6–1.1)
ECHO AO ROOT DIAM: 3 CM
ECHO AO ROOT INDEX: 1.26 CM/M2
ECHO AV AREA PEAK VELOCITY: 2.5 CM2
ECHO AV AREA VTI: 3.2 CM2
ECHO AV AREA/BSA PEAK VELOCITY: 1.1 CM2/M2
ECHO AV AREA/BSA VTI: 1.3 CM2/M2
ECHO AV MEAN GRADIENT: 9 MMHG
ECHO AV MEAN VELOCITY: 1.5 M/S
ECHO AV PEAK GRADIENT: 14 MMHG
ECHO AV PEAK VELOCITY: 1.9 M/S
ECHO AV VELOCITY RATIO: 1
ECHO AV VTI: 27 CM
ECHO BSA: 2.54 M2
ECHO LA AREA 4C: 10.7 CM2
ECHO LA DIAMETER INDEX: 0.8 CM/M2
ECHO LA DIAMETER: 1.9 CM
ECHO LA MAJOR AXIS: 6.1 CM
ECHO LA TO AORTIC ROOT RATIO: 0.63
ECHO LA VOL MOD A4C: 15 ML (ref 22–52)
ECHO LA VOLUME INDEX MOD A4C: 6 ML/M2 (ref 16–34)
ECHO LV E' LATERAL VELOCITY: 7 CM/S
ECHO LV EDV A4C: 64 ML
ECHO LV EDV INDEX A4C: 27 ML/M2
ECHO LV EJECTION FRACTION A4C: 65 %
ECHO LV ESV A4C: 22 ML
ECHO LV ESV INDEX A4C: 9 ML/M2
ECHO LV FRACTIONAL SHORTENING: 45 % (ref 28–44)
ECHO LV INTERNAL DIMENSION DIASTOLE INDEX: 1.68 CM/M2
ECHO LV INTERNAL DIMENSION DIASTOLIC: 4 CM (ref 3.9–5.3)
ECHO LV INTERNAL DIMENSION SYSTOLIC INDEX: 0.92 CM/M2
ECHO LV INTERNAL DIMENSION SYSTOLIC: 2.2 CM
ECHO LV IVSD: 1.6 CM (ref 0.6–0.9)
ECHO LV MASS 2D: 232.7 G (ref 67–162)
ECHO LV MASS INDEX 2D: 97.8 G/M2 (ref 43–95)
ECHO LV POSTERIOR WALL DIASTOLIC: 1.4 CM (ref 0.6–0.9)
ECHO LV RELATIVE WALL THICKNESS RATIO: 0.7
ECHO LVOT AREA: 2.5 CM2
ECHO LVOT AV VTI INDEX: 1.24
ECHO LVOT DIAM: 1.8 CM
ECHO LVOT MEAN GRADIENT: 8 MMHG
ECHO LVOT PEAK GRADIENT: 14 MMHG
ECHO LVOT PEAK VELOCITY: 1.9 M/S
ECHO LVOT STROKE VOLUME INDEX: 35.9 ML/M2
ECHO LVOT SV: 85.5 ML
ECHO LVOT VTI: 33.6 CM
ECHO MV A VELOCITY: 1.22 M/S
ECHO MV E VELOCITY: 0.93 M/S
ECHO MV E/A RATIO: 0.76
ECHO MV E/E' LATERAL: 13.29
ESTIMATED AVERAGE GLUCOSE: 131 MG/DL
GFR SERPL CREATININE-BSD FRML MDRD: >90 ML/MIN/1.73M2
GLUCOSE SERPL-MCNC: 107 MG/DL (ref 70–99)
HBA1C MFR BLD: 6.2 % (ref 4.2–6.3)
HCT VFR BLD CALC: 40.8 % (ref 37–47)
HDLC SERPL-MCNC: 45 MG/DL
HEMOGLOBIN: 12.8 GM/DL (ref 12.5–16)
LDLC SERPL CALC-MCNC: 117 MG/DL
MAGNESIUM: 2.2 MG/DL (ref 1.8–2.4)
MCH RBC QN AUTO: 28.3 PG (ref 27–31)
MCHC RBC AUTO-ENTMCNC: 31.4 % (ref 32–36)
MCV RBC AUTO: 90.3 FL (ref 78–100)
PDW BLD-RTO: 13.5 % (ref 11.7–14.9)
PHOSPHORUS: 4.8 MG/DL (ref 2.5–4.9)
PLATELET # BLD: 264 K/CU MM (ref 140–440)
PMV BLD AUTO: 10.1 FL (ref 7.5–11.1)
POTASSIUM SERPL-SCNC: 3.9 MMOL/L (ref 3.5–5.1)
RBC # BLD: 4.52 M/CU MM (ref 4.2–5.4)
SODIUM BLD-SCNC: 139 MMOL/L (ref 135–145)
TRIGL SERPL-MCNC: 90 MG/DL
TROPONIN, HIGH SENSITIVITY: 19 NG/L (ref 0–14)
TROPONIN, HIGH SENSITIVITY: 29 NG/L (ref 0–14)
TROPONIN, HIGH SENSITIVITY: 32 NG/L (ref 0–14)
WBC # BLD: 6.1 K/CU MM (ref 4–10.5)

## 2024-04-30 PROCEDURE — 85027 COMPLETE CBC AUTOMATED: CPT

## 2024-04-30 PROCEDURE — 36415 COLL VENOUS BLD VENIPUNCTURE: CPT

## 2024-04-30 PROCEDURE — 84100 ASSAY OF PHOSPHORUS: CPT

## 2024-04-30 PROCEDURE — 93306 TTE W/DOPPLER COMPLETE: CPT

## 2024-04-30 PROCEDURE — 93306 TTE W/DOPPLER COMPLETE: CPT | Performed by: INTERNAL MEDICINE

## 2024-04-30 PROCEDURE — 6370000000 HC RX 637 (ALT 250 FOR IP): Performed by: INTERNAL MEDICINE

## 2024-04-30 PROCEDURE — 83735 ASSAY OF MAGNESIUM: CPT

## 2024-04-30 PROCEDURE — 84484 ASSAY OF TROPONIN QUANT: CPT

## 2024-04-30 PROCEDURE — 2580000003 HC RX 258: Performed by: STUDENT IN AN ORGANIZED HEALTH CARE EDUCATION/TRAINING PROGRAM

## 2024-04-30 PROCEDURE — 6370000000 HC RX 637 (ALT 250 FOR IP): Performed by: STUDENT IN AN ORGANIZED HEALTH CARE EDUCATION/TRAINING PROGRAM

## 2024-04-30 PROCEDURE — 80048 BASIC METABOLIC PNL TOTAL CA: CPT

## 2024-04-30 PROCEDURE — 94761 N-INVAS EAR/PLS OXIMETRY MLT: CPT

## 2024-04-30 RX ORDER — BUTALBITAL, ACETAMINOPHEN AND CAFFEINE 50; 325; 40 MG/1; MG/1; MG/1
1 TABLET ORAL EVERY 4 HOURS PRN
Qty: 180 TABLET | Refills: 1 | Status: SHIPPED | OUTPATIENT
Start: 2024-04-30

## 2024-04-30 RX ORDER — ATORVASTATIN CALCIUM 40 MG/1
40 TABLET, FILM COATED ORAL NIGHTLY
Qty: 60 TABLET | Refills: 2 | Status: SHIPPED | OUTPATIENT
Start: 2024-04-30

## 2024-04-30 RX ORDER — HYDROCHLOROTHIAZIDE 25 MG/1
25 TABLET ORAL DAILY
Qty: 60 TABLET | Refills: 2 | Status: SHIPPED | OUTPATIENT
Start: 2024-05-01

## 2024-04-30 RX ORDER — LISINOPRIL 20 MG/1
20 TABLET ORAL DAILY
Status: DISCONTINUED | OUTPATIENT
Start: 2024-05-01 | End: 2024-04-30 | Stop reason: HOSPADM

## 2024-04-30 RX ORDER — HYDROCHLOROTHIAZIDE 25 MG/1
25 TABLET ORAL DAILY
Status: DISCONTINUED | OUTPATIENT
Start: 2024-04-30 | End: 2024-04-30 | Stop reason: HOSPADM

## 2024-04-30 RX ORDER — LISINOPRIL 20 MG/1
20 TABLET ORAL DAILY
Qty: 60 TABLET | Refills: 2 | Status: SHIPPED | OUTPATIENT
Start: 2024-05-01 | End: 2024-05-03 | Stop reason: DRUGHIGH

## 2024-04-30 RX ORDER — ASPIRIN 81 MG/1
81 TABLET, CHEWABLE ORAL DAILY
Qty: 60 TABLET | Refills: 2 | Status: SHIPPED | OUTPATIENT
Start: 2024-05-01

## 2024-04-30 RX ADMIN — HYDROCHLOROTHIAZIDE 25 MG: 25 TABLET ORAL at 10:29

## 2024-04-30 RX ADMIN — SODIUM CHLORIDE, PRESERVATIVE FREE 10 ML: 5 INJECTION INTRAVENOUS at 09:08

## 2024-04-30 RX ADMIN — LISINOPRIL 5 MG: 5 TABLET ORAL at 09:07

## 2024-04-30 RX ADMIN — ASPIRIN 81 MG: 81 TABLET, CHEWABLE ORAL at 09:07

## 2024-04-30 NOTE — PROGRESS NOTES
4 Eyes Skin Assessment     NAME:  Martinez Archuleta  YOB: 1965  MEDICAL RECORD NUMBER:  [id number]    The patient is being assessed for  Admission    I agree that at least one RN has performed a thorough Head to Toe Skin Assessment on the patient. ALL assessment sites listed below have been assessed.      Areas assessed by both nurses:    Head, Face, Ears, Shoulders, Back, Chest, Arms, Elbows, Hands, Sacrum. Buttock, Coccyx, Ischium, Legs. Feet and Heels, and Under Medical Devices         Does the Patient have a Wound? No noted wound(s)       Art Prevention initiated by RN: Yes  Wound Care Orders initiated by RN: No    Pressure Injury (Stage 3,4, Unstageable, DTI, NWPT, and Complex wounds) if present, place Wound referral order by RN under : No    New Ostomies, if present place, Ostomy referral order under : Yes     Nurse 1 eSignature: Electronically signed by Vic Lechuga RN on 4/29/24 at 11:36 PM EDT    **SHARE this note so that the co-signing nurse can place an eSignature**    Nurse 2 eSignature: Electronically signed by Valery Bullock RN on 4/30/24 at 6:28 AM EDT

## 2024-04-30 NOTE — CONSULTS
Session ID: [id number]  Language: Malian Creole   ID: #[id number]   Name: Gurwinder    He interpret to her that her O2 sat desat to 85% when she sleeps and we need to start her on oxygen nasal cannula, asked her about social determinant, explained to her why we need to use the pneumatic compression device and her Cardene was stopped because her blood pressure is fine.

## 2024-04-30 NOTE — DISCHARGE INSTRUCTIONS
Delroy stafford [facility]  2-1-1:   Enfòmasymar serrano  [phone] o 2-1-1  [email or web address]    Cinthia Ortega:  koupon sarbjit virgen, terrell gaston, gadri sibvansyone, PRC  [phone]    [facility]:   virgen, èd ak lwaye, rad ak b  [phone] or [phone]    Mariana mix:   virgen, èd ak lwaye, rad ak b  [phone]    [facility] ak [address]:     [facility]  [address] [place] [address]  [phone]     celine ortega yo  [address] [place] [address]  [phone]     [facility]:   [facility], klinik sante granmoyola avendano dapre revni ou  [address] [place], [place]   [phone] o [phone]    [facility]scaringrid anderson PresM[facility] Cent[facility]on:  asistans sarbjit [facility]: [phone]  [email or web address]: asistans sarbjit [facility]kr[facility] Hospitalyon   [facility] Center: [facility] efren, [facility] Hospit[facility]den avendano selon revni ou  [address]   [place] [address]  [phone]    [employer] [place]:  Nitrisyon sarbjit tibebe ak servando riverawa selon revni  [address], [place] [id number] [phone]    Transpò  S.C.A.T:  ofri sèvis otobis nan [facility]. ADA ofri sèvis pòt an pòt sarbjit moun ki gen andikap  [phone]    Ryan yon vwayaj:    [phone]    [facility]: transpò sarbjit granmoun salo yo  [phone]

## 2024-04-30 NOTE — PROGRESS NOTES
Used video  to do assessment, educated pt on plan of care, and answer any questions pt had. Pt asked about resources for food, meds, etc. This RN reached out to CM, they will come see pt.     name Abi.

## 2024-04-30 NOTE — CONSULTS
Session ID: [id number]  Language: Anguillan Niesha   ID: #[id number]   Name: Lety      Provided pt with education on new medication, Hydrochlorothiazide. All questions answered, pt verbalized understanding.

## 2024-04-30 NOTE — PROGRESS NOTES
Admission questions answered by the patient, explained to her how to use call light and about the medication that she will take and IV drips that is ongoing.  Amanda #[id number] translate my conversation with the patient.

## 2024-04-30 NOTE — PLAN OF CARE
Problem: Discharge Planning  Goal: Discharge to home or other facility with appropriate resources  4/30/2024 1119 by Rosy Brizuela, RN  Outcome: Progressing  4/29/2024 2355 by Vic Lechuga, RN  Outcome: Progressing     Problem: Pain  Goal: Verbalizes/displays adequate comfort level or baseline comfort level  4/30/2024 1119 by Rosy Brizuela, RN  Outcome: Progressing  4/29/2024 2355 by Vic Lechuga, RN  Outcome: Progressing

## 2024-04-30 NOTE — PROGRESS NOTES
This RN went over discharge instructions with pt with help of in house , Vikash. Pt was educated on her medications - what they were for, and when to take them. Pt verbalized understanding with teach-back method. All questions answered at this time.

## 2024-04-30 NOTE — PROGRESS NOTES
Outpatient Pharmacy Progress Note for Meds-to-Beds    Total number of Prescriptions Filled: 4    Additional Documentation:  Medications given to nurse Rosy to provide to patient  The following prescription(s) were not covered under the patient's insurance because they can be purchased as OTC medications: Aspirin       Thank you for letting us serve your patients.  [place] [id number]    Phone: [phone]    Fax: [phone]

## 2024-04-30 NOTE — CARE COORDINATION
Chart reviewed. From home with family and children. Independent of her ADLs. No PCP. Has insurance. Resources in Tristanian creole placed on AVS. No further CM needs id'd. Discharge plan for home with family. CM is available if needed.   04/30/24 0736   Service Assessment   Patient Orientation Alert and Oriented   Cognition Alert   History Provided By Medical Record   Primary Caregiver Self   Support Systems Family Members;Children   Patient's Healthcare Decision Maker is: Legal Next of Kin   PCP Verified by CM No   Prior Functional Level Independent in ADLs/IADLs   Current Functional Level Independent in ADLs/IADLs   Can patient return to prior living arrangement Yes   Ability to make needs known: Good   Family able to assist with home care needs: Yes   Would you like for me to discuss the discharge plan with any other family members/significant others, and if so, who? No   Financial Resources Other (Comment)  (BCBS pathway)   Social/Functional History   Lives With Family

## 2024-05-01 ENCOUNTER — CARE COORDINATION (OUTPATIENT)
Dept: CASE MANAGEMENT | Age: 59
End: 2024-05-01

## 2024-05-01 LAB
CULTURE: NORMAL
EKG ATRIAL RATE: 78 BPM
EKG ATRIAL RATE: 83 BPM
EKG DIAGNOSIS: NORMAL
EKG DIAGNOSIS: NORMAL
EKG P AXIS: -6 DEGREES
EKG P AXIS: 32 DEGREES
EKG P-R INTERVAL: 120 MS
EKG P-R INTERVAL: 132 MS
EKG Q-T INTERVAL: 364 MS
EKG Q-T INTERVAL: 370 MS
EKG QRS DURATION: 76 MS
EKG QRS DURATION: 80 MS
EKG QTC CALCULATION (BAZETT): 421 MS
EKG QTC CALCULATION (BAZETT): 427 MS
EKG R AXIS: -2 DEGREES
EKG R AXIS: 5 DEGREES
EKG T AXIS: 26 DEGREES
EKG T AXIS: 9 DEGREES
EKG VENTRICULAR RATE: 78 BPM
EKG VENTRICULAR RATE: 83 BPM
Lab: NORMAL
SPECIMEN: NORMAL
STREP A DIRECT SCREEN: NEGATIVE

## 2024-05-01 PROCEDURE — 93010 ELECTROCARDIOGRAM REPORT: CPT | Performed by: INTERNAL MEDICINE

## 2024-05-01 NOTE — CARE COORDINATION
Care Transitions Outreach Attempt    Call within 2 business days of discharge: Yes     Patient: Martinez Archuleta Patient : 1965 MRN: [id number]    Last Discharge Facility       Date Complaint Diagnosis Description Type Department Provider    24 Headache; Shortness of Breath Dyspnea on exertion ... ED to Hosp-Admission (Discharged) (ADMITTED) [facility] 3N Linda Juarez MD; Dorys Mejia MD   Noted following upcoming appointments from discharge chart review:   [facility] follow up appointment(s): No future appointments.      Attempted call to Patient w/ assistance of [facility]  [phone]== Tay # [id number]. Unable to reach Patient or leave voicemail. Will attempt again at later date.  No MyChart. No PCP.

## 2024-05-02 ENCOUNTER — CARE COORDINATION (OUTPATIENT)
Dept: CASE MANAGEMENT | Age: 59
End: 2024-05-02

## 2024-05-02 NOTE — CARE COORDINATION
Care Transitions Initial Follow Up Call    Call within 2 business days of discharge: Yes    Patient Current Location:  Home: [address] [id number]    Care Transition Nurse contacted the patient by telephone to perform post hospital discharge assessment. Verified name and  as identifiers. Provided introduction to self, and explanation of the Care Transition Nurse role.     Patient: Martinez Archuleta Patient : 1965   MRN: [id number]  Reason for Admission: Htn Urgency  Discharge Date: 24 RARS: Readmission Risk Score: 5.8  Facility: [facility] 24-24    Last Discharge Facility       Date Complaint Diagnosis Description Type Department Provider    24 Headache; Shortness of Breath Dyspnea on exertion ... ED to Hosp-Admission (Discharged) (ADMITTED) [facility] 3N Linda Juarez MD; Dorys Mejia MD     Challenges to be reviewed by the provider   Additional needs identified to be addressed with provider: No       Method of communication with provider: none.    [address] [facility] [place]  Medialive # [id number] assisted call w/ Patient.   Patient reports \"definitely better, but tired\". Reports /89 las evening, still has to check bp today. Denies h/a, blurry vision, cp, ac distress. Confirmed taking Asa, Hctz, Lipitor, Lisinopril as directed. Does not have PCP/Cardiologist (see below).  Advised heart healthy diet, low in sodium and fats. Agreeable to RD referral. Reports appetite, fluid intake good w/ b&b wnl.  Lives w/ family. Independent adls, does not drive; family provides transportation. Denies resource needs including hhc, dme, community assistance.       Care Transition Nurse reviewed discharge instructions, medical action plan, and red flags with patient who verbalized understanding. The patient was given an opportunity to ask questions and does not have any further questions or concerns at this time. Were discharge instructions available to patient? Yes. Reviewed appropriate site of care

## 2024-05-03 ENCOUNTER — CARE COORDINATION (OUTPATIENT)
Dept: CARE COORDINATION | Age: 59
End: 2024-05-03

## 2024-05-03 ENCOUNTER — OFFICE VISIT (OUTPATIENT)
Dept: FAMILY MEDICINE CLINIC | Age: 59
End: 2024-05-03

## 2024-05-03 VITALS
SYSTOLIC BLOOD PRESSURE: 150 MMHG | OXYGEN SATURATION: 93 % | RESPIRATION RATE: 18 BRPM | DIASTOLIC BLOOD PRESSURE: 98 MMHG | BODY MASS INDEX: 48.82 KG/M2 | WEIGHT: 293 LBS | HEART RATE: 94 BPM | HEIGHT: 65 IN

## 2024-05-03 DIAGNOSIS — I10 PRIMARY HYPERTENSION: Primary | ICD-10-CM

## 2024-05-03 DIAGNOSIS — Z09 HOSPITAL DISCHARGE FOLLOW-UP: ICD-10-CM

## 2024-05-03 RX ORDER — LISINOPRIL 30 MG/1
30 TABLET ORAL DAILY
Qty: 30 TABLET | Refills: 0 | Status: SHIPPED | OUTPATIENT
Start: 2024-05-03 | End: 2024-05-23 | Stop reason: SDUPTHER

## 2024-05-03 NOTE — CARE COORDINATION
Martinez Archuleta  May 3, 2024    Initial Referral Reason: Hypertension     Patient Care Team:  Melany Flores RN as Care Transitions Nurse  Beatrice Rodríguez RD, LD as Dietitian (Dietitian Registered)    Past Medical History:    Current Outpatient Medications   Medication Sig Dispense Refill    lisinopril (PRINIVIL;ZESTRIL) 30 MG tablet Take 1 tablet by mouth daily 30 tablet 0    butalbital-acetaminophen-caffeine (FIORICET, ESGIC) -40 MG per tablet Take 1 tablet by mouth every 4 hours as needed for Headaches 180 tablet 1    aspirin 81 MG chewable tablet Take 1 tablet by mouth daily 60 tablet 2    atorvastatin (LIPITOR) 40 MG tablet Take 1 tablet by mouth nightly 60 tablet 2    hydroCHLOROthiazide (HYDRODIURIL) 25 MG tablet Take 1 tablet by mouth daily 60 tablet 2     No current facility-administered medications for this visit.       Biochemical Data, Medical Tests and Procedures:    Lab Results   Component Value Date    LABA1C 6.2 04/29/2024     Lab Results   Component Value Date     04/29/2024       Lab Results   Component Value Date    CHOL 180 04/29/2024    CHOL 225 (H) 03/21/2024    CHOL 166 10/21/2022     Lab Results   Component Value Date    TRIG 90 04/29/2024    TRIG 111 03/21/2024    TRIG 71 10/21/2022     Lab Results   Component Value Date    HDL 45 04/29/2024    HDL 55 03/21/2024    HDL 46 10/21/2022       Anthropometric Measurements:    Height: 65 inches (165.1 cm)  Weight: 298 lb (135.2 kg)  BMI: 49.59 (obesity class III)  IBW: 125 lb (56.8 kg) +-10%  %IBW: 238.4%   AdBW: 191 lb (86.8 kg)    Physical Exam Findings:  Deferred    Nutrition Interview:   RD received referral from Melany DUARTE:  EL referral --Htn. Requires [place] Islander  per [employer] [phone]     RD called patient using [employer] #[id number]. RD explained reason for call and role in care. Pt states she typically eats 3 meals/day. See food recall below. EL explained the importance of watching

## 2024-05-03 NOTE — CONSULTS
Session ID: [id number]  Language: Citizen of [place] Crekeyon   ID: #[id number]   Name: Dale

## 2024-05-06 ENCOUNTER — HOSPITAL ENCOUNTER (OUTPATIENT)
Age: 59
Discharge: HOME | End: 2024-05-06
Payer: COMMERCIAL

## 2024-05-06 DIAGNOSIS — I10 PRIMARY HYPERTENSION: ICD-10-CM

## 2024-05-06 LAB
ANION GAP SERPL CALCULATED.3IONS-SCNC: 11 MMOL/L (ref 7–16)
BUN SERPL-MCNC: 19 MG/DL (ref 6–23)
CALCIUM SERPL-MCNC: 9.4 MG/DL (ref 8.3–10.6)
CHLORIDE BLD-SCNC: 100 MMOL/L (ref 99–110)
CO2: 29 MMOL/L (ref 21–32)
CREAT SERPL-MCNC: 0.9 MG/DL (ref 0.6–1.1)
GFR, ESTIMATED: 73 ML/MIN/1.73M2
GLUCOSE SERPL-MCNC: 121 MG/DL (ref 70–99)
POTASSIUM SERPL-SCNC: 4 MMOL/L (ref 3.5–5.1)
SODIUM BLD-SCNC: 140 MMOL/L (ref 135–145)

## 2024-05-06 PROCEDURE — 80048 BASIC METABOLIC PNL TOTAL CA: CPT

## 2024-05-06 PROCEDURE — 36415 COLL VENOUS BLD VENIPUNCTURE: CPT

## 2024-05-07 ENCOUNTER — CARE COORDINATION (OUTPATIENT)
Dept: CASE MANAGEMENT | Age: 59
End: 2024-05-07

## 2024-05-07 NOTE — CARE COORDINATION
Care Transitions Follow Up Call    Patient Current Location:  Home: [address] [address]    Care Transition Nurse contacted the patient by telephone to follow up after admission on 24-24.  Verified name and  as identifiers.    Patient: Martinez Archuleta  Patient : 1965   MRN: [id number]  Reason for Admission: Htn Urgency   Discharge Date: 24 RARS: Readmission Risk Score: 5.8  Facility: [facility]     Needs to be reviewed by the provider   Additional needs identified to be addressed with provider: No     Method of communication with provider: none.    AMN Hogan : Jan # [id number]    Patient reports doing well. Patient was seen at [facility] 5/3/24 w/ new rx for Lisinopril 30 mg qd (increased). Med education provided, reviewed doses, action. Advised to stop Lisinopril 20 mg. Patient v/u. Reports taking Hctx, Lipitor and Asa as well. Denies h/a, cp, acute distress. Has not checked bp today as person who checks it for her is not home. Plan to f/u at [facility] 24. New PCP appt set up w/ Karena Espinoza NP 24. Reports appetite, fluid intake good w/ b&b wnl.  Denies resource needs.     Future Appointments   Date Time Provider Department Center   2024  9:30 AM Manoj Pimentel APRN - CNP [facility]   2024  9:30 AM Karena Espinoza APRN - CNP [facility]     Care Transition Nurse reviewed medical action plan and red flags with patient and discussed any barriers to care and/or understanding of plan of care after discharge. Discussed appropriate site of care based on symptoms and resources available to patient including: PCP  Urgent care clinics  [facility] .      Patients top risk factors for readmission: medical condition-htn, medication management, and language barrier  Interventions to address risk factors:  as above    Offered patient enrollment in the Remote Patient Monitoring (RPM) program for in-home monitoring: Patient is not eligible for RPM program because: no PCP

## 2024-05-15 ENCOUNTER — CARE COORDINATION (OUTPATIENT)
Dept: CASE MANAGEMENT | Age: 59
End: 2024-05-15

## 2024-05-15 NOTE — CARE COORDINATION
and/or understanding of plan of care after discharge. Discussed appropriate site of care based on symptoms and resources available to patient including:  WIC .     Patients top risk factors for readmission: medical condition-as above, in need of bp monitoring system  Interventions to address risk factors: note routed to Manoj Pimentel NP as above    Offered patient enrollment in the Remote Patient Monitoring (RPM) program for in-home monitoring: not eligible as not established yet w/ [facility] provider.      Care Transitions Subsequent and Final Call    Schedule Follow Up Appointment with PCP: Completed  Subsequent and Final Calls  Do you have any ongoing symptoms?: Yes  Onset of Patient-reported symptoms: Other  Patient-reported symptoms: Pain  Interventions for patient-reported symptoms: Other  Have your medications changed?: No  Do you have any questions related to your medications?: No  Do you currently have any active services?: No  Do you have any needs or concerns that I can assist you with?: Yes  Patient-reported Needs or Concerns: in need of bp monitoring system-- see note  Identified Barriers: Other  Care Transitions Interventions   Home Care Waiver: Declined        Transportation Support: Declined      DME Assistance: Declined   Other Interventions:             Care Transition Nurse provided contact information for future needs. Plan for follow-up call in 5-7 days based on severity of symptoms and risk factors.  Plan for next call: f/u on [facility] appt--lisinopril refill?, lle pain addressed?, bp monitoring system addressed?    Melany Flores RN

## 2024-05-21 ASSESSMENT — ENCOUNTER SYMPTOMS
SORE THROAT: 0
DIARRHEA: 0
RHINORRHEA: 0
SINUS PAIN: 0
VOMITING: 0
SINUS PRESSURE: 0
SHORTNESS OF BREATH: 0
COUGH: 0
WHEEZING: 0
NAUSEA: 0
CHEST TIGHTNESS: 0

## 2024-05-21 NOTE — PROGRESS NOTES
Martinez Archuleta   58 y.o.  female  [id number]      Chief Complaint   Patient presents with    Follow-Up from Hospital      #[id number]. Pt states she is taking medication as prescribed and did take her medication this am.        Subjective:  58 y.o.female is here for a follow up. She has the following chronic/acute medical problems:  Patient Active Problem List   Diagnosis    Class 3 severe obesity with body mass index (BMI) of 45.0 to 49.9 in adult (HCC)    Primary hypertension    Prediabetes    Left hand pain       Interpetor I Pad #[id number]    Patient is here for ahopital follow up.         Review of Systems    Current Outpatient Medications   Medication Sig Dispense Refill    butalbital-acetaminophen-caffeine (FIORICET, ESGIC) -40 MG per tablet Take 1 tablet by mouth every 4 hours as needed for Headaches 180 tablet 1    lisinopril (PRINIVIL;ZESTRIL) 20 MG tablet Take 1 tablet by mouth daily 60 tablet 2    aspirin 81 MG chewable tablet Take 1 tablet by mouth daily 60 tablet 2    atorvastatin (LIPITOR) 40 MG tablet Take 1 tablet by mouth nightly 60 tablet 2    hydroCHLOROthiazide (HYDRODIURIL) 25 MG tablet Take 1 tablet by mouth daily 60 tablet 2     No current facility-administered medications for this visit.        Past medical, family,surgical history reviewed today.     Objective:  BP (!) 150/98   Pulse 94   Resp 18   Ht 1.651 m (5' 5\")   Wt 135.2 kg (298 lb)   SpO2 93%   BMI 49.59 kg/m²   BP Readings from Last 3 Encounters:   05/03/24 (!) 150/98   04/30/24 (!) 146/96   03/21/24 (!) 190/140     Wt Readings from Last 3 Encounters:   05/03/24 135.2 kg (298 lb)   04/30/24 (!) 140.2 kg (309 lb)   03/21/24 132.9 kg (293 lb)         Physical Exam    Lab Results   Component Value Date    WBC 6.1 04/30/2024    HGB 12.8 04/30/2024    HCT 40.8 04/30/2024    MCV 90.3 04/30/2024     04/30/2024     Lab Results   Component Value Date     04/30/2024    K 3.9 04/30/2024     04/30/2024    CO2 
you have any questions, ask your nurse or doctor.                START taking these medications      lisinopril 30 MG tablet  Commonly known as: PRINIVIL;ZESTRIL  Take 1 tablet by mouth daily  Started by: CIERRA Cramer CNP            CONTINUE taking these medications      aspirin 81 MG chewable tablet  Take 1 tablet by mouth daily     atorvastatin 40 MG tablet  Commonly known as: LIPITOR  Take 1 tablet by mouth nightly     butalbital-acetaminophen-caffeine -40 MG per tablet  Commonly known as: FIORICET, ESGIC  Take 1 tablet by mouth every 4 hours as needed for Headaches     hydroCHLOROthiazide 25 MG tablet  Commonly known as: HYDRODIURIL  Take 1 tablet by mouth daily               Where to Get Your Medications        These medications were sent to [facility] #[id number] - [address] [phone] - F [phone]  [address] [place] [id number]      Phone: [phone]   lisinopril 30 MG tablet           Medications marked \"taking\" at this time  Outpatient Medications Marked as Taking for the 5/3/24 encounter (Office Visit) with Manoj Pimentel APRN - CNP   Medication Sig Dispense Refill    lisinopril (PRINIVIL;ZESTRIL) 30 MG tablet Take 1 tablet by mouth daily 30 tablet 0    butalbital-acetaminophen-caffeine (FIORICET, ESGIC) -40 MG per tablet Take 1 tablet by mouth every 4 hours as needed for Headaches 180 tablet 1    aspirin 81 MG chewable tablet Take 1 tablet by mouth daily 60 tablet 2    atorvastatin (LIPITOR) 40 MG tablet Take 1 tablet by mouth nightly 60 tablet 2    hydroCHLOROthiazide (HYDRODIURIL) 25 MG tablet Take 1 tablet by mouth daily 60 tablet 2        Medications patient taking as of now reconciled against medications ordered at time of hospital discharge: Yes    Review of Systems   Constitutional:  Negative for appetite change, chills, fatigue and fever.   HENT:  Negative for congestion, ear pain, postnasal drip, rhinorrhea, sinus

## 2024-05-23 ENCOUNTER — CARE COORDINATION (OUTPATIENT)
Dept: CASE MANAGEMENT | Age: 59
End: 2024-05-23

## 2024-05-23 ENCOUNTER — OFFICE VISIT (OUTPATIENT)
Dept: FAMILY MEDICINE CLINIC | Age: 59
End: 2024-05-23
Payer: COMMERCIAL

## 2024-05-23 VITALS
OXYGEN SATURATION: 95 % | RESPIRATION RATE: 16 BRPM | BODY MASS INDEX: 48.82 KG/M2 | HEART RATE: 87 BPM | WEIGHT: 293 LBS | SYSTOLIC BLOOD PRESSURE: 136 MMHG | HEIGHT: 65 IN | DIASTOLIC BLOOD PRESSURE: 92 MMHG

## 2024-05-23 DIAGNOSIS — I10 PRIMARY HYPERTENSION: ICD-10-CM

## 2024-05-23 PROCEDURE — 3075F SYST BP GE 130 - 139MM HG: CPT | Performed by: NURSE PRACTITIONER

## 2024-05-23 PROCEDURE — 3080F DIAST BP >= 90 MM HG: CPT | Performed by: NURSE PRACTITIONER

## 2024-05-23 PROCEDURE — 99214 OFFICE O/P EST MOD 30 MIN: CPT | Performed by: NURSE PRACTITIONER

## 2024-05-23 RX ORDER — LISINOPRIL 40 MG/1
40 TABLET ORAL DAILY
Qty: 30 TABLET | Refills: 1 | Status: SHIPPED | OUTPATIENT
Start: 2024-05-23

## 2024-05-23 ASSESSMENT — ENCOUNTER SYMPTOMS
SHORTNESS OF BREATH: 0
CHEST TIGHTNESS: 0
WHEEZING: 0
SINUS PAIN: 0
SINUS PRESSURE: 0
COUGH: 0
VOMITING: 0
RHINORRHEA: 0
NAUSEA: 0
DIARRHEA: 0
SORE THROAT: 0

## 2024-05-23 NOTE — PROGRESS NOTES
Martinez Archuleta   58 y.o.  female  [id number]      Chief Complaint   Patient presents with    Follow-up        Subjective:  58 y.o.female is here for a follow up. She has the following chronic/acute medical problems:  Patient Active Problem List   Diagnosis    Class 3 severe obesity with body mass index (BMI) of 45.0 to 49.9 in adult ([facility])    Primary hypertension    Prediabetes    Left hand pain       Ipad Interpretor # [id number]    Patient is here for a 3 week follow up for blood pressure. Patient is currently taking Lisinopril 30 mg daily and hydrochlorothiazide 25 mg daily.             Review of Systems   Constitutional:  Negative for appetite change, chills, fatigue and fever.   HENT:  Negative for congestion, ear pain, postnasal drip, rhinorrhea, sinus pressure, sinus pain, sneezing and sore throat.    Respiratory:  Negative for cough, chest tightness, shortness of breath and wheezing.    Cardiovascular:  Negative for chest pain and palpitations.   Gastrointestinal:  Negative for diarrhea, nausea and vomiting.   Skin:  Negative for rash.   Neurological:  Negative for dizziness, light-headedness and headaches.       Current Outpatient Medications   Medication Sig Dispense Refill    lisinopril (PRINIVIL;ZESTRIL) 40 MG tablet Take 1 tablet by mouth daily 30 tablet 1    butalbital-acetaminophen-caffeine (FIORICET, ESGIC) -40 MG per tablet Take 1 tablet by mouth every 4 hours as needed for Headaches 180 tablet 1    aspirin 81 MG chewable tablet Take 1 tablet by mouth daily 60 tablet 2    atorvastatin (LIPITOR) 40 MG tablet Take 1 tablet by mouth nightly 60 tablet 2    hydroCHLOROthiazide (HYDRODIURIL) 25 MG tablet Take 1 tablet by mouth daily 60 tablet 2     No current facility-administered medications for this visit.        Past medical, family,surgical history reviewed today.     Objective:  BP (!) 136/92   Pulse 87   Resp 16   Ht 1.651 m (5' 5\")   Wt (!) 136.4 kg (300 lb 12.8 oz)   SpO2 95%   BMI 50.06

## 2024-05-23 NOTE — CARE COORDINATION
can help Patient obtain bp monitoring system    Call to [facility] [phone] as per Med Assist advisement. No answer, no vm option.    Melany Flores RN

## 2024-05-24 ENCOUNTER — CARE COORDINATION (OUTPATIENT)
Dept: CARE COORDINATION | Age: 59
End: 2024-05-24

## 2024-05-24 NOTE — CARE COORDINATION
Plan of Care:  RD encouraged pt to keep working toward goals set. RD will follow up with pt to discuss any questions pt has and check the progress toward goals.     Follow Up:    RD will call pt in 3-4 weeks to follow up and answer any nutrition related questions at that time.     Beatrice Rodríguez RDN, LD  [phone]

## 2024-05-31 ENCOUNTER — CARE COORDINATION (OUTPATIENT)
Dept: CASE MANAGEMENT | Age: 59
End: 2024-05-31

## 2024-05-31 NOTE — CARE COORDINATION
Care Transitions Outreach Attempt    Patient: Martinez Archuleta Patient : 1965 MRN: [id number]  Reason for Admission: Htn Urgency   Discharge Date: 24       RARS: Readmission Risk Score: 5.8  Facility: [facility]   Last Discharge Facility       Date Complaint Diagnosis Description Type Department Provider    24 Headache; Shortness of Breath Dyspnea on exertion ... ED to Hosp-Admission (Discharged) (ADMITTED) [facility] 3N Linda Juarez MD; Dorys Mejia MD     Future Appointments   Date Time Provider Department Center   2024  9:30 AM Karena Espinoza APRN - CNP [facility]     Call to [facility] [phone] as per Med Assist advisement. No answer, no vm option.     T/C USS-- agency does not offer bp monitoring systems.     [phone]  Jose J Creole  Beto # [id number]  Attempt to reach for Care Transition follow up call unsuccessful as no answer, no vm option.

## 2024-06-03 ENCOUNTER — CARE COORDINATION (OUTPATIENT)
Dept: CASE MANAGEMENT | Age: 59
End: 2024-06-03

## 2024-06-03 NOTE — CARE COORDINATION
Care Transitions Follow Up Call    Patient Current Location:  Home: [address] [address]    Care Transition Nurse contacted Patient by telephone to follow up after admission on 24-24.  Verified Patient name and  as identifiers.    Patient: Martinez Archuleta  Patient : 1965   MRN: [id number]  Reason for Admission: Htn Urgency   Discharge Date: 24 RARS: Readmission Risk Score: 5.8  Facility: [facility]     Needs to be reviewed by the provider   Additional needs identified to be addressed with provider: No     Method of communication with provider: none.    [phone] [place] Creole  Ruben #[id number]    Patient reports doing well. Reports occasional headache. Reminded to take prn Fioricet. Denies chest pain, sob, blurry vision, acute distress. Reports taking Lisinopril 40 mg qd as directed. Reports taking HCTZ, Atorvastatin, Asa as directed also. Advised Patient I have been unable to locate agency which will provide free bp monitoring system. Advised Patient to ask MD for dme rx at 24 new provider appt. Patient agreeable. Does reports feet pain--advised to discuss at 24 appt or WIC. Reports appetite, fluid intake good w/ b&b wnl. Denies resource needs.       Future Appointments   Date Time Provider Department Center   2024  9:30 AM Karena Espinoza, CIERRA - CNP [facility]     Care Transition Nurse reviewed medical action plan and red flags with patient and discussed any barriers to care and/or understanding of plan of care after discharge. Discussed appropriate site of care based on symptoms and resources available to patient including: PCP  Urgent care clinics.   [place]    Offered patient enrollment in the Remote Patient Monitoring (RPM) program for in-home monitoring: not available for [place] Creole and not yet established jordan/ [facility] (pending)     Care Transitions Subsequent and Final Call    Subsequent and Final Calls  Do you have any ongoing symptoms?:

## 2024-06-08 DIAGNOSIS — I10 PRIMARY HYPERTENSION: ICD-10-CM

## 2024-06-10 RX ORDER — LISINOPRIL 30 MG/1
30 TABLET ORAL DAILY
Qty: 30 TABLET | Refills: 0 | OUTPATIENT
Start: 2024-06-10 | End: 2024-07-10

## 2024-06-13 ENCOUNTER — CARE COORDINATION (OUTPATIENT)
Dept: CARE COORDINATION | Age: 59
End: 2024-06-13

## 2024-06-13 NOTE — CARE COORDINATION
RD called patient using [facility] Language [employer]: Gabonese Creole  Nelia #[id number] and session code 78032. RD outreached to patient for Dietitian follow up. RD unable to leave VM at this time- no answer and no VM option. RD will contact pt within one week and follow up as appropriate.       Beatrice Rodríguez RDN, LD  [phone]     11-Aug-2020 05:20

## 2024-06-19 ENCOUNTER — CARE COORDINATION (OUTPATIENT)
Dept: CARE COORDINATION | Age: 59
End: 2024-06-19

## 2024-06-19 NOTE — CARE COORDINATION
Martinez Archuleta  6/19/2024    Registered Dietitian Progress Note for Care Coordination    Assessment: Martinez is a 58 y.o. female. RD referred for HTN. RD spoke with patient for initial nutrition assessment on 5/3/24. RD called to follow up with pt today 6/19/24. RD called patient using [employer] #[id number] and session code 42848. RD discussed previous goals with pt. Reiterated the importance of eating 3 meals/day and following a low sodium diet closely. Reviewed the components of a balanced meal using MyPlate and discussed incorporating a variety of fruits, vegetables, whole grains, lean protein and low fat dairy. Reiterated the importance of avoiding the salt shaker, reading food labels closely to help choose foods lower in sodium and draining/rinsing canned goods. Patient verbalizes understanding. Patient has no nutrition related questions or concerns at this time. Patient declined need for future follow up calls at this time. Per chart review, patient has OV with PCP on 6/24/24.     Nutrition Monitoring and Evaluation  Indicator/Goal Criteria Progress   #1 Eat balanced meals consistently throughout the day.   #1 Focus on eating 3 meals/day and make these meals balanced using the MyPlate reference-1/2 plate fruits and/or vegetables, 1/4 plate protein and 1/4 plate starchy carbohydrates with 8 oz glass of low fat milk if desired.  #1 Patient is eating 3 meals/day and making meals more balanced using MyPlate.    #2   Monitor daily sodium intake. Keep sodium from food and beverages to no more than 2000 mg/day.  #2  Avoid the salt shaker. Read food labels to help choose lower sodium options. Watch portion sizes.  #2 Patient is trying to follow a low sodium diet closely.         Plan of Care:  RD encouraged pt to keep working toward goals set. RD explained to pt this is final follow up call and provided contact information to pt. Encouraged pt to call RD in future with any nutrition related questions or

## 2024-06-24 ENCOUNTER — OFFICE VISIT (OUTPATIENT)
Dept: FAMILY MEDICINE CLINIC | Age: 59
End: 2024-06-24
Payer: COMMERCIAL

## 2024-06-24 VITALS
DIASTOLIC BLOOD PRESSURE: 88 MMHG | RESPIRATION RATE: 18 BRPM | WEIGHT: 293 LBS | HEART RATE: 78 BPM | TEMPERATURE: 97.2 F | OXYGEN SATURATION: 96 % | SYSTOLIC BLOOD PRESSURE: 138 MMHG | HEIGHT: 63 IN | BODY MASS INDEX: 51.91 KG/M2

## 2024-06-24 DIAGNOSIS — J30.89 SEASONAL ALLERGIC RHINITIS DUE TO OTHER ALLERGIC TRIGGER: ICD-10-CM

## 2024-06-24 DIAGNOSIS — Z76.89 ENCOUNTER TO ESTABLISH CARE: Primary | ICD-10-CM

## 2024-06-24 DIAGNOSIS — I10 PRIMARY HYPERTENSION: ICD-10-CM

## 2024-06-24 DIAGNOSIS — R73.03 PREDIABETES: ICD-10-CM

## 2024-06-24 DIAGNOSIS — E78.5 HYPERLIPIDEMIA, UNSPECIFIED HYPERLIPIDEMIA TYPE: ICD-10-CM

## 2024-06-24 DIAGNOSIS — H92.02 LEFT EAR PAIN: ICD-10-CM

## 2024-06-24 DIAGNOSIS — R29.898 LEFT HAND WEAKNESS: ICD-10-CM

## 2024-06-24 DIAGNOSIS — E66.1 CLASS 3 DRUG-INDUCED OBESITY WITH SERIOUS COMORBIDITY AND BODY MASS INDEX (BMI) OF 50.0 TO 59.9 IN ADULT (HCC): ICD-10-CM

## 2024-06-24 PROCEDURE — 99213 OFFICE O/P EST LOW 20 MIN: CPT | Performed by: NURSE PRACTITIONER

## 2024-06-24 PROCEDURE — 3079F DIAST BP 80-89 MM HG: CPT | Performed by: NURSE PRACTITIONER

## 2024-06-24 PROCEDURE — 3075F SYST BP GE 130 - 139MM HG: CPT | Performed by: NURSE PRACTITIONER

## 2024-06-24 RX ORDER — ATORVASTATIN CALCIUM 40 MG/1
40 TABLET, FILM COATED ORAL NIGHTLY
Qty: 30 TABLET | Refills: 5 | Status: SHIPPED | OUTPATIENT
Start: 2024-06-24

## 2024-06-24 RX ORDER — FLUTICASONE PROPIONATE 50 MCG
1 SPRAY, SUSPENSION (ML) NASAL DAILY
Qty: 16 G | Refills: 0 | Status: SHIPPED | OUTPATIENT
Start: 2024-06-24

## 2024-06-24 RX ORDER — HYDROCHLOROTHIAZIDE 25 MG/1
25 TABLET ORAL DAILY
Qty: 30 TABLET | Refills: 5 | Status: SHIPPED | OUTPATIENT
Start: 2024-06-24

## 2024-06-24 RX ORDER — LISINOPRIL 40 MG/1
40 TABLET ORAL DAILY
Qty: 30 TABLET | Refills: 5 | Status: SHIPPED | OUTPATIENT
Start: 2024-06-24

## 2024-06-24 RX ORDER — HYDROCHLOROTHIAZIDE 25 MG/1
25 TABLET ORAL DAILY
Qty: 90 TABLET | OUTPATIENT
Start: 2024-06-24

## 2024-06-24 RX ORDER — ATORVASTATIN CALCIUM 40 MG/1
40 TABLET, FILM COATED ORAL NIGHTLY
Qty: 90 TABLET | OUTPATIENT
Start: 2024-06-24

## 2024-06-24 SDOH — ECONOMIC STABILITY: FOOD INSECURITY: WITHIN THE PAST 12 MONTHS, THE FOOD YOU BOUGHT JUST DIDN'T LAST AND YOU DIDN'T HAVE MONEY TO GET MORE.: NEVER TRUE

## 2024-06-24 SDOH — ECONOMIC STABILITY: HOUSING INSECURITY
IN THE LAST 12 MONTHS, WAS THERE A TIME WHEN YOU DID NOT HAVE A STEADY PLACE TO SLEEP OR SLEPT IN A SHELTER (INCLUDING NOW)?: NO

## 2024-06-24 SDOH — ECONOMIC STABILITY: INCOME INSECURITY: HOW HARD IS IT FOR YOU TO PAY FOR THE VERY BASICS LIKE FOOD, HOUSING, MEDICAL CARE, AND HEATING?: NOT HARD AT ALL

## 2024-06-24 SDOH — ECONOMIC STABILITY: FOOD INSECURITY: WITHIN THE PAST 12 MONTHS, YOU WORRIED THAT YOUR FOOD WOULD RUN OUT BEFORE YOU GOT MONEY TO BUY MORE.: NEVER TRUE

## 2024-06-24 ASSESSMENT — ENCOUNTER SYMPTOMS
WHEEZING: 0
SHORTNESS OF BREATH: 0
CHEST TIGHTNESS: 0
COUGH: 0

## 2024-06-24 ASSESSMENT — PATIENT HEALTH QUESTIONNAIRE - PHQ9
SUM OF ALL RESPONSES TO PHQ QUESTIONS 1-9: 0
SUM OF ALL RESPONSES TO PHQ9 QUESTIONS 1 & 2: 0
1. LITTLE INTEREST OR PLEASURE IN DOING THINGS: NOT AT ALL
2. FEELING DOWN, DEPRESSED OR HOPELESS: NOT AT ALL
SUM OF ALL RESPONSES TO PHQ QUESTIONS 1-9: 0

## 2024-06-24 NOTE — PROGRESS NOTES
Martinez Archuleta  1965  58 y.o.    SUBJECT LUZ:    Chief Complaint   Patient presents with    Established New Doctor    Hypertension     B/P    Medication Refill     Finished medications.       EDEN Henriquez is a 58 year old female who is in to establish care. She speaks Citizen of [place] Creole and her visit is conducted using an  via Ipad. She [facility]. She was given refill of blood pressure medication but did not understand she had a refill which would    She complains of ear pain, mostly on the left. She states she has not tried any treatments. She denies fevers, chills or sweats.    Past medical history  She gives a medical history of hypertension and pelvic pain. She has seen gyn and is currently being managed by them.    Family medical history  She gives a family medical history of hypertension (brother).    Hypertension  She takes lisinopril and hydrochlorothiazide for high blood pressure. Blood pressure today 138/88. She denies chest pain, palpitations or shortness of breath.    Ear pain  She complains of left ear pain for a long time. She also has some itching of the left ear. She denies nasal drainage or sore throat.    Hand pain/weakness  She states she has weakness of the hands but denies dropping. She complains of not having strength of hands. She also complains of left shoulder and left leg pain. She states she has had this symptoms for almost one year. She states the discomfort has been getting worse.     Weight gain  She states she continues to gain weight. She states she does not exercise or walk much. Discussed eating habits and intake of high caloric foods, all leading to weight gain.    Current Outpatient Medications on File Prior to Visit   Medication Sig Dispense Refill    butalbital-acetaminophen-caffeine (FIORICET, ESGIC) -40 MG per tablet Take 1 tablet by mouth every 4 hours as needed for Headaches (Patient not taking: Reported on 6/24/2024) 180

## 2024-06-25 ASSESSMENT — ENCOUNTER SYMPTOMS: GASTROINTESTINAL NEGATIVE: 1
